# Patient Record
Sex: FEMALE | Race: WHITE | HISPANIC OR LATINO | Employment: UNEMPLOYED | URBAN - METROPOLITAN AREA
[De-identification: names, ages, dates, MRNs, and addresses within clinical notes are randomized per-mention and may not be internally consistent; named-entity substitution may affect disease eponyms.]

---

## 2021-11-11 ENCOUNTER — GYNECOLOGY VISIT (OUTPATIENT)
Dept: OBGYN | Facility: CLINIC | Age: 36
End: 2021-11-11
Payer: COMMERCIAL

## 2021-11-11 VITALS
DIASTOLIC BLOOD PRESSURE: 84 MMHG | HEART RATE: 86 BPM | WEIGHT: 189 LBS | RESPIRATION RATE: 20 BRPM | SYSTOLIC BLOOD PRESSURE: 124 MMHG

## 2021-11-11 DIAGNOSIS — N93.8 DUB (DYSFUNCTIONAL UTERINE BLEEDING): ICD-10-CM

## 2021-11-11 PROCEDURE — 99203 OFFICE O/P NEW LOW 30 MIN: CPT | Mod: 25 | Performed by: ADVANCED PRACTICE MIDWIFE

## 2021-11-11 PROCEDURE — 76857 US EXAM PELVIC LIMITED: CPT | Performed by: ADVANCED PRACTICE MIDWIFE

## 2021-11-11 NOTE — PROGRESS NOTES
Mayte Spivey is a 36 y.o. female who presents for DUB        HPI Comments: Pt presents for positive pregnancy test.  Patient's last menstrual period was 08/29/2021 (approximate). She reports no nausea at this time. Does complain of constipation with taking prenatal vitamin.    Review of Systems   Pertinent positives documented in HPI and all other systems reviewed & are negative      History reviewed. No pertinent past medical history.    Medications:   No current [facility]-ordered outpatient medications on file.     No current Epic-ordered facility-administered medications on file.          Objective:   Vital measurements:  Wt 85.7 kg (189 lb)   There is no height or weight on file to calculate BMI. (Goal BM I>18 <25)    Physical Exam   Nursing note and vitals reviewed.  Constitutional: She is oriented to person, place, and time. She appears well-developed and well-nourished. No distress.     Genitourinary:  Uterus size of 12   No vaginal bleeding or discharge noted.     Musculoskeletal: Normal range of motion. She exhibits no edema and no tenderness.     Skin: Skin is warm and dry. No rash noted. She is not diaphoretic. No erythema. No pallor.     Psychiatric: She has a normal mood and affect. Her behavior is normal. Judgment and thought content normal.     Transabdominal Ultrasound  Indication: DUB    Findings:  CRL: 4.05cm 4.14cm 4.23cm    FHR: 166 bpm      Impression: Single intrauterine pregnancy measuring 11 weeks and 0 days with cardiac activity.    Per ACOG dating guidelines, final ANIL is 06/05/2022 based on LMP consistent with US today.    Ultrasound performed and read by myself.          Assessment:     1. DUB (dysfunctional uterine bleeding)         Plan:   1. Discussed importance of prenatal vitamins with patient. Encouraged daily use.  2. Discussed NIPT testing with patient and kit was provided. Briefly discussed advanced maternal age and use of daily ASA 81 mg. She will start this in 2 weeks.   3.  Follow up in 2-3 weeks for NOB visit.

## 2021-11-11 NOTE — NON-PROVIDER
Pt here for DUB.    Pt states she is constipated, and is always hungry.   Good# [phone]  LMP: 8/29/21, 10w4d today.   Pharmacy confirmed .

## 2021-11-30 ENCOUNTER — HOSPITAL ENCOUNTER (OUTPATIENT)
Facility: MEDICAL CENTER | Age: 36
End: 2021-11-30
Attending: NURSE PRACTITIONER
Payer: COMMERCIAL

## 2021-11-30 ENCOUNTER — INITIAL PRENATAL (OUTPATIENT)
Dept: OBGYN | Facility: CLINIC | Age: 36
End: 2021-11-30
Payer: COMMERCIAL

## 2021-11-30 ENCOUNTER — APPOINTMENT (OUTPATIENT)
Dept: OBGYN | Facility: CLINIC | Age: 36
End: 2021-11-30
Payer: COMMERCIAL

## 2021-11-30 VITALS
BODY MASS INDEX: 38.51 KG/M2 | HEIGHT: 59 IN | WEIGHT: 191 LBS | SYSTOLIC BLOOD PRESSURE: 126 MMHG | DIASTOLIC BLOOD PRESSURE: 78 MMHG

## 2021-11-30 DIAGNOSIS — O09.891 SUPERVISION OF OTHER HIGH RISK PREGNANCIES, FIRST TRIMESTER: ICD-10-CM

## 2021-11-30 DIAGNOSIS — O09.521 AMA (ADVANCED MATERNAL AGE) MULTIGRAVIDA 35+, FIRST TRIMESTER: ICD-10-CM

## 2021-11-30 DIAGNOSIS — O09.299 H/O MACROSOMIA IN INFANT IN PRIOR PREGNANCY, CURRENTLY PREGNANT: ICD-10-CM

## 2021-11-30 DIAGNOSIS — O09.891 SUPERVISION OF OTHER HIGH RISK PREGNANCIES, FIRST TRIMESTER: Primary | ICD-10-CM

## 2021-11-30 PROBLEM — O09.523 MULTIGRAVIDA OF ADVANCED MATERNAL AGE IN THIRD TRIMESTER: Status: RESOLVED | Noted: 2021-11-30 | Resolved: 2021-11-30

## 2021-11-30 PROBLEM — O09.523 MULTIGRAVIDA OF ADVANCED MATERNAL AGE IN THIRD TRIMESTER: Status: ACTIVE | Noted: 2021-11-30

## 2021-11-30 LAB
APPEARANCE UR: ABNORMAL
BILIRUB UR STRIP-MCNC: ABNORMAL MG/DL
COLOR UR AUTO: ABNORMAL
GLUCOSE UR STRIP.AUTO-MCNC: NEGATIVE MG/DL
KETONES UR STRIP.AUTO-MCNC: NEGATIVE MG/DL
LEUKOCYTE ESTERASE UR QL STRIP.AUTO: NEGATIVE
NITRITE UR QL STRIP.AUTO: NEGATIVE
PH UR STRIP.AUTO: 8.5 [PH] (ref 5–8)
PROT UR QL STRIP: NEGATIVE MG/DL
RBC UR QL AUTO: NEGATIVE
SP GR UR STRIP.AUTO: 1.02
UROBILINOGEN UR STRIP-MCNC: ABNORMAL MG/DL

## 2021-11-30 PROCEDURE — 59402 PR NEW OB HIGH RISK: CPT | Performed by: NURSE PRACTITIONER

## 2021-11-30 PROCEDURE — 81002 URINALYSIS NONAUTO W/O SCOPE: CPT | Performed by: NURSE PRACTITIONER

## 2021-11-30 PROCEDURE — 90686 IIV4 VACC NO PRSV 0.5 ML IM: CPT | Performed by: NURSE PRACTITIONER

## 2021-11-30 PROCEDURE — 88175 CYTOPATH C/V AUTO FLUID REDO: CPT

## 2021-11-30 PROCEDURE — 90471 IMMUNIZATION ADMIN: CPT | Performed by: NURSE PRACTITIONER

## 2021-11-30 PROCEDURE — 87591 N.GONORRHOEAE DNA AMP PROB: CPT

## 2021-11-30 PROCEDURE — 87491 CHLMYD TRACH DNA AMP PROBE: CPT

## 2021-11-30 ASSESSMENT — EDINBURGH POSTNATAL DEPRESSION SCALE (EPDS)
I HAVE BEEN SO UNHAPPY THAT I HAVE HAD DIFFICULTY SLEEPING: NOT AT ALL
I HAVE FELT SCARED OR PANICKY FOR NO GOOD REASON: NO, NOT AT ALL
I HAVE LOOKED FORWARD WITH ENJOYMENT TO THINGS: AS MUCH AS I EVER DID
I HAVE FELT SAD OR MISERABLE: NO, NOT AT ALL
THINGS HAVE BEEN GETTING ON TOP OF ME: NO, I HAVE BEEN COPING AS WELL AS EVER
THE THOUGHT OF HARMING MYSELF HAS OCCURRED TO ME: NEVER
I HAVE BLAMED MYSELF UNNECESSARILY WHEN THINGS WENT WRONG: NO, NEVER
I HAVE BEEN SO UNHAPPY THAT I HAVE BEEN CRYING: NO, NEVER
TOTAL SCORE: 0
I HAVE BEEN ANXIOUS OR WORRIED FOR NO GOOD REASON: NO, NOT AT ALL
I HAVE BEEN ABLE TO LAUGH AND SEE THE FUNNY SIDE OF THINGS: AS MUCH AS I ALWAYS COULD

## 2021-11-30 NOTE — PROGRESS NOTES
NOB today. Had DUB 11/11/21  Last pap: needs one today  Phone # [phone]  Pharmacy confirmed  On PNV  Cystic Fibrosis test offered.  Early 1 gtt pended  No problems today  Flu vaccine given. R  Deltoid. Screening checklist reviewed with patient. VIS given. Verified by AC

## 2021-11-30 NOTE — PROGRESS NOTES
CC: New Ob visit     Subjective Ms. Mayte Spivey  13w2d by LMP c/w 11wk US presents for prenatal care. Today is her first prenatal visit at [facility] She denies any contractions/cramping, leakage of fluid, vaginal bleeding. She does feel faint movement.  Not presently working.  Pt denies any other pregnancies besides her first one.  Reports her first labor was short with a 9# baby.  No GDM.    I have reviewed the patients' medical, surgical, gynecological, obstetrical, social, and family histories, medications and available lab results and pertinent notes are as follows:     OB History    Para Term  AB Living   2 1 1 0 0 1   SAB IAB Ectopic Molar Multiple Live Births   0 0 0 0 0 1       Past Gynecological History:  Denies fibroids, ovarian cysts, abnormal pap smears, STDs. She denies ever having surgery on her cervix, uterus, or ovaries in the past.  Last pap smear was - none on file.    Past Medical History:   Diagnosis Date   • Anemia    • Blood transfusion without reported diagnosis      Past Surgical History:   Procedure Laterality Date   • ABDOMINAL EXPLORATION     • CHOLECYSTECTOMY        Social History     Socioeconomic History   • Marital status: Single     Spouse name: Not on file   • Number of children: Not on file   • Years of education: Not on file   • Highest education level: Not on file   Occupational History   • Not on file   Tobacco Use   • Smoking status: Never Smoker   • Smokeless tobacco: Never Used   Vaping Use   • Vaping Use: Never used   Substance and Sexual Activity   • Alcohol use: Not Currently   • Drug use: Not Currently     Types: Marijuana   • Sexual activity: Yes     Partners: Male     Comment: None    Other Topics Concern   • Not on file   Social History Narrative   • Not on file     Social Determinants of Health     Financial Resource Strain:    • Difficulty of Paying Living Expenses: Not on file   Food Insecurity:    • Worried About Running  Out of Food in the Last Year: Not on file   • Ran Out of Food in the Last Year: Not on file   Transportation Needs:    • Lack of Transportation (Medical): Not on file   • Lack of Transportation (Non-Medical): Not on file   Physical Activity:    • Days of Exercise per Week: Not on file   • Minutes of Exercise per Session: Not on file   Stress:    • Feeling of Stress : Not on file   Social Connections:    • Frequency of Communication with Friends and Family: Not on file   • Frequency of Social Gatherings with Friends and Family: Not on file   • Attends Muslim Services: Not on file   • Active Member of Clubs or Organizations: Not on file   • Attends Club or Organization Meetings: Not on file   • Marital Status: Not on file   Intimate Partner Violence:    • Fear of Current or Ex-Partner: Not on file   • Emotionally Abused: Not on file   • Physically Abused: Not on file   • Sexually Abused: Not on file   Housing Stability:    • Unable to Pay for Housing in the Last Year: Not on file   • Number of Places Lived in the Last Year: Not on file   • Unstable Housing in the Last Year: Not on file     Family History:   Family History   Problem Relation Age of Onset   • Cancer Father    • Prostate cancer Father        Genetic Screening/Teratology Counseling- Includes patient, baby's father, or anyone in either family with:  Patient's age 35 years or older as of estimated date of delivery: Yes   Thalassemia (Italian, Greek, Mediterranean, or  background): MCV less than 80: No   Neural tube defect (Meningomyelocele, Spina bifida, or Anencephaly): No   Congenital heart defect: No   Down syndrome: No   Myles-Sachs (Ashkenazi Quaker, Cajun, [place]): No   Canavan disease (Ashkenazi Quaker): No   Familial dysautonomia (Ashkenazi Quaker): No   Sickle cell disease or trait (): No   Hemophilia or other blood disorders: No   Muscular dystrophy: No    Cystic fibrosis: No   Burbank's chorea: No   Mental  "retardation/autism: No   Other inherited genetic or chromosomal disorder: No   Maternal metabolic disorder (eg. Type 1 diabetes, PKU): No   Patient or baby's father had child with birth defects not listed above: No   Recurrent pregnancy loss, or a stillbirth: No   Medications (including supplements, vitamins, herbs, or OTC drugs)/illicit/recreational drugs/alcohol since last menstrual period: No           Infection Prevention  1. High Risk For HIV: No 6. Rash Or Illness Since LMP: No   2. High Risk For Hepatitis B or C: No 7. History Of STD, GC, Chlamydia, HPV Syphilis: No   3. Live With Someone With TB Or Exposed To TB: No 8. Have a cat in the home?: No   4. Patient Or Partner Has A History Of Herpes: No    5. History of Chicken Pox: Yes 9. Other (See Comments Below): No   Comments: Planned and desired pregnancy. FOB involved and supportive. Different FOB. No outside work         Allergies: Patient has no known allergies.  Objective:/78   Ht 1.499 m (4' 11\")   Wt 86.6 kg (191 lb)      Objective:   FHTs: 150s via BSUS  Vitals:    21 1021   BP: 126/78     Prenatal Physical:  General Exam:  HEENT: normal    Heart: normal    Thyroid: normal    Lungs: normal    Lymph Nodes: normal    Breasts: normal    Neurological: normal    Abdomen: normal    Skin: normal    Extremities: normal    Pelvic Exam:    x 1 proven to 9#13oz  Vulva:  Normal  Vagina:  Normal  Cervix:  Normal  Uterus (wks):  13  Adnexa:  Normal  Rectum:  Not assessed       Lab: No results found for this or any previous visit (from the past 672 hour(s)).    Ultrasound:  Reviewed initial scan and ANIL is by CHHAYA c/w US    Assessment:  Patient Active Problem List   Diagnosis   • Supervision of other high risk pregnancies, first trimester   • AMA (advanced maternal age) multigravida 35+, first trimester   • H/O macrosomia in infant in prior pregnancy, currently pregnant       Plan:  Reviewed the patients risk factors for this pregnancy and " recommend the need for referral to perinatology - pt declines.  Prefers to do US at [facility].  Genetic screening was discussed with literature on Prenatal Screening, Cystic Fibrosis, and SMA provided and the patient plans to do NIPT, was already ordered by Phan.  Discuss importance of water intake, healthy diet, eating 5 small meals throughout the day to maintain blood sugar and taking PNV  If has nausea, take Vitamin B6 25mg TID with doxylamine/Unisom 25mg at night  Discuss importance of exercise, as well as rest  Prenatal labs, including MSAFP and early 1hr GTT ordered.  Pap/GCCT done.  Flu vaccine given.  SIMIN for delivery note @ [employer].  Complete OB US 7 wks.  Discuss policy for OB care at [facility]  Follow up in 4 weeks for next visit     Chaperone offered and provided by Lyubov García MA.    I have placed the below orders and discussed them with an approved delegating provider. The MA is performing the below orders under the direction of  Dr. Carmona.

## 2021-12-01 LAB
C TRACH DNA GENITAL QL NAA+PROBE: NEGATIVE
CYTOLOGY REG CYTOL: NORMAL
N GONORRHOEA DNA GENITAL QL NAA+PROBE: NEGATIVE
SPECIMEN SOURCE: NORMAL

## 2021-12-06 ENCOUNTER — TELEPHONE (OUTPATIENT)
Dept: OBGYN | Facility: CLINIC | Age: 36
End: 2021-12-06

## 2021-12-06 NOTE — TELEPHONE ENCOUNTER
----- Message from YOAN Lambert sent at 12/3/2021  8:25 AM PST -----  +Yeast - pt may do monistat 7 x 7 nights if symptomatic.  12/6/21@10:46am. Patient was notified and denies any symptoms.

## 2022-01-18 ENCOUNTER — TELEPHONE (OUTPATIENT)
Dept: OBGYN | Facility: CLINIC | Age: 37
End: 2022-01-18

## 2022-01-18 ENCOUNTER — ROUTINE PRENATAL (OUTPATIENT)
Dept: OBGYN | Facility: CLINIC | Age: 37
End: 2022-01-18
Payer: COMMERCIAL

## 2022-01-18 ENCOUNTER — APPOINTMENT (OUTPATIENT)
Dept: RADIOLOGY | Facility: IMAGING CENTER | Age: 37
End: 2022-01-18
Attending: NURSE PRACTITIONER
Payer: COMMERCIAL

## 2022-01-18 VITALS — WEIGHT: 199 LBS | DIASTOLIC BLOOD PRESSURE: 68 MMHG | BODY MASS INDEX: 40.19 KG/M2 | SYSTOLIC BLOOD PRESSURE: 128 MMHG

## 2022-01-18 DIAGNOSIS — O09.299 H/O MACROSOMIA IN INFANT IN PRIOR PREGNANCY, CURRENTLY PREGNANT: ICD-10-CM

## 2022-01-18 DIAGNOSIS — O09.521 AMA (ADVANCED MATERNAL AGE) MULTIGRAVIDA 35+, FIRST TRIMESTER: ICD-10-CM

## 2022-01-18 DIAGNOSIS — O09.891 SUPERVISION OF OTHER HIGH RISK PREGNANCIES, FIRST TRIMESTER: Primary | ICD-10-CM

## 2022-01-18 DIAGNOSIS — O09.891 SUPERVISION OF OTHER HIGH RISK PREGNANCIES, FIRST TRIMESTER: ICD-10-CM

## 2022-01-18 DIAGNOSIS — R93.89 ABNORMAL ULTRASOUND: Primary | ICD-10-CM

## 2022-01-18 DIAGNOSIS — O44.20 MARGINAL PLACENTA PREVIA: ICD-10-CM

## 2022-01-18 PROCEDURE — 76817 TRANSVAGINAL US OBSTETRIC: CPT | Mod: TC | Performed by: NURSE PRACTITIONER

## 2022-01-18 PROCEDURE — 90040 PR PRENATAL FOLLOW UP: CPT | Performed by: NURSE PRACTITIONER

## 2022-01-18 PROCEDURE — 76805 OB US >/= 14 WKS SNGL FETUS: CPT | Mod: TC | Performed by: NURSE PRACTITIONER

## 2022-01-18 NOTE — TELEPHONE ENCOUNTER
----- Message from YOAN Lambert sent at 1/18/2022  1:57 PM PST -----  Questionable AFO > referral sent to perinatology.  Marginal placenta previa > complete pelvic rest. No sex.  Review previa precautions.  We will f/u placenta location w perinatology.      Pt notified of placenta previa, advised to be on pelvic rest, no sex, nothing in vagina, no lifting anything > 10lb, if notice VB to go to L&D ASAP. Pt also notified need to recheck baby's heart for possible AFO. Explained to pt for the repeat US it has to be done with a perinatology. Pt informed referral was placed today and it usually take about 2 business days to be process, once it gets sent referral department will send her a massage via [employer] and the perinatology office should be calling her within 2 days. Advised to call the perinatology office on Monday 1/24/2022 if she doesn't hear from them or call us back if she has any questions. Pt notified her placenta location will be recheck with perinatology. Pt agreed and verbalized understanding.

## 2022-01-18 NOTE — PROGRESS NOTES
,OB follow up   + fetal movement.  No VB, LOF or UC's.  Phone # [phone]  Preferred pharmacy confirmed.  US done today  PNP not done yet.  NIPT not done, still planning to do it

## 2022-01-18 NOTE — PROGRESS NOTES
S: No c/o.  Has been stressed out with her other child who has seizures.  He was in the hospital for a few days and she hasn't had time to get her labs done as yet.  Just had US.  Has not yet done NIPT.  Reports faint FM.  Denies VB, LOF,  RUCs or vaginal DC.      O:    Vitals:    01/18/22 0948   BP: 128/68   Weight: 90.3 kg (199 lb)     See flow sheet.    A: IUP 20w2d  Patient Active Problem List    Diagnosis Date Noted   • Supervision of other high risk pregnancies, first trimester 11/30/2021   • AMA (advanced maternal age) multigravida 35+, first trimester 11/30/2021   • H/O macrosomia in infant in prior pregnancy, currently pregnant 11/30/2021       P:  1.  Reviewed labs w pt. PNP and NIPT not done - encouraged pt to complete asap.        2.  Not done yet AFP.        3.  US is pending, just completed.        4.  Questions answered.        5.  Encouraged adequate water intake.        6.  F/u 4 wks.

## 2022-02-15 PROBLEM — O09.522 AMA (ADVANCED MATERNAL AGE) MULTIGRAVIDA 35+, SECOND TRIMESTER: Status: ACTIVE | Noted: 2021-11-30

## 2022-04-09 ENCOUNTER — HOSPITAL ENCOUNTER (EMERGENCY)
Facility: MEDICAL CENTER | Age: 37
End: 2022-04-10
Attending: OBSTETRICS & GYNECOLOGY | Admitting: OBSTETRICS & GYNECOLOGY
Payer: COMMERCIAL

## 2022-04-09 LAB
ABO GROUP BLD: NORMAL
ALBUMIN SERPL BCP-MCNC: 3.1 G/DL (ref 3.2–4.9)
ALBUMIN/GLOB SERPL: 1.1 G/DL
ALP SERPL-CCNC: 82 U/L (ref 30–99)
ALT SERPL-CCNC: 8 U/L (ref 2–50)
ANION GAP SERPL CALC-SCNC: 12 MMOL/L (ref 7–16)
APPEARANCE UR: CLEAR
APTT PPP: 26.7 SEC (ref 24.7–36)
AST SERPL-CCNC: 14 U/L (ref 12–45)
BASOPHILS # BLD AUTO: 0.3 % (ref 0–1.8)
BASOPHILS # BLD: 0.03 K/UL (ref 0–0.12)
BILIRUB SERPL-MCNC: 0.2 MG/DL (ref 0.1–1.5)
BLD GP AB SCN SERPL QL: NORMAL
BUN SERPL-MCNC: 10 MG/DL (ref 8–22)
CALCIUM SERPL-MCNC: 9 MG/DL (ref 8.5–10.5)
CHLORIDE SERPL-SCNC: 105 MMOL/L (ref 96–112)
CO2 SERPL-SCNC: 19 MMOL/L (ref 20–33)
COLOR UR AUTO: YELLOW
CREAT SERPL-MCNC: 0.32 MG/DL (ref 0.5–1.4)
CREAT UR-MCNC: 101.47 MG/DL
EOSINOPHIL # BLD AUTO: 0.21 K/UL (ref 0–0.51)
EOSINOPHIL NFR BLD: 1.9 % (ref 0–6.9)
ERYTHROCYTE [DISTWIDTH] IN BLOOD BY AUTOMATED COUNT: 40.3 FL (ref 35.9–50)
ERYTHROCYTE [DISTWIDTH] IN BLOOD BY AUTOMATED COUNT: 40.8 FL (ref 35.9–50)
GFR SERPLBLD CREATININE-BSD FMLA CKD-EPI: 137 ML/MIN/1.73 M 2
GLOBULIN SER CALC-MCNC: 2.9 G/DL (ref 1.9–3.5)
GLUCOSE 1H P 50 G GLC PO SERPL-MCNC: 121 MG/DL (ref 70–139)
GLUCOSE SERPL-MCNC: 90 MG/DL (ref 65–99)
GLUCOSE UR QL STRIP.AUTO: NEGATIVE MG/DL
HBV SURFACE AG SER QL: ABNORMAL
HCT VFR BLD AUTO: 29.1 % (ref 37–47)
HCT VFR BLD AUTO: 29.7 % (ref 37–47)
HCV AB SER QL: ABNORMAL
HGB BLD-MCNC: 9.3 G/DL (ref 12–16)
HGB BLD-MCNC: 9.6 G/DL (ref 12–16)
HIV 1+2 AB+HIV1 P24 AG SERPL QL IA: NORMAL
IMM GRANULOCYTES # BLD AUTO: 0.16 K/UL (ref 0–0.11)
IMM GRANULOCYTES NFR BLD AUTO: 1.4 % (ref 0–0.9)
INR PPP: 1.03 (ref 0.87–1.13)
KETONES UR QL STRIP.AUTO: NEGATIVE MG/DL
LEUKOCYTE ESTERASE UR QL STRIP.AUTO: ABNORMAL
LYMPHOCYTES # BLD AUTO: 2.09 K/UL (ref 1–4.8)
LYMPHOCYTES NFR BLD: 18.7 % (ref 22–41)
MCH RBC QN AUTO: 26.7 PG (ref 27–33)
MCH RBC QN AUTO: 26.8 PG (ref 27–33)
MCHC RBC AUTO-ENTMCNC: 32 G/DL (ref 33.6–35)
MCHC RBC AUTO-ENTMCNC: 32.3 G/DL (ref 33.6–35)
MCV RBC AUTO: 82.5 FL (ref 81.4–97.8)
MCV RBC AUTO: 83.9 FL (ref 81.4–97.8)
MONOCYTES # BLD AUTO: 0.93 K/UL (ref 0–0.85)
MONOCYTES NFR BLD AUTO: 8.3 % (ref 0–13.4)
NEUTROPHILS # BLD AUTO: 7.73 K/UL (ref 2–7.15)
NEUTROPHILS NFR BLD: 69.4 % (ref 44–72)
NITRITE UR QL STRIP.AUTO: NEGATIVE
NRBC # BLD AUTO: 0 K/UL
NRBC BLD-RTO: 0 /100 WBC
PH UR STRIP.AUTO: 6 [PH] (ref 5–8)
PLATELET # BLD AUTO: 330 K/UL (ref 164–446)
PLATELET # BLD AUTO: 367 K/UL (ref 164–446)
PMV BLD AUTO: 8.6 FL (ref 9–12.9)
PMV BLD AUTO: 8.9 FL (ref 9–12.9)
POTASSIUM SERPL-SCNC: 4.5 MMOL/L (ref 3.6–5.5)
PROT SERPL-MCNC: 6 G/DL (ref 6–8.2)
PROT UR QL STRIP: 30 MG/DL
PROT UR-MCNC: 22 MG/DL (ref 0–15)
PROT/CREAT UR: 217 MG/G (ref 10–107)
PROTHROMBIN TIME: 13.2 SEC (ref 12–14.6)
RBC # BLD AUTO: 3.47 M/UL (ref 4.2–5.4)
RBC # BLD AUTO: 3.6 M/UL (ref 4.2–5.4)
RBC UR QL AUTO: ABNORMAL
RH BLD: NORMAL
RUBV AB SER QL: 55.5 IU/ML
SODIUM SERPL-SCNC: 136 MMOL/L (ref 135–145)
SP GR UR STRIP.AUTO: 1.02 (ref 1–1.03)
T PALLIDUM AB SER QL IA: ABNORMAL
URATE SERPL-MCNC: 3.5 MG/DL (ref 1.9–8.2)
WBC # BLD AUTO: 11.2 K/UL (ref 4.8–10.8)
WBC # BLD AUTO: 12.3 K/UL (ref 4.8–10.8)

## 2022-04-09 PROCEDURE — 96372 THER/PROPH/DIAG INJ SC/IM: CPT

## 2022-04-09 PROCEDURE — 36415 COLL VENOUS BLD VENIPUNCTURE: CPT

## 2022-04-09 PROCEDURE — 86762 RUBELLA ANTIBODY: CPT

## 2022-04-09 PROCEDURE — 86900 BLOOD TYPING SEROLOGIC ABO: CPT

## 2022-04-09 PROCEDURE — 86780 TREPONEMA PALLIDUM: CPT

## 2022-04-09 PROCEDURE — 81002 URINALYSIS NONAUTO W/O SCOPE: CPT

## 2022-04-09 PROCEDURE — 84550 ASSAY OF BLOOD/URIC ACID: CPT

## 2022-04-09 PROCEDURE — 86901 BLOOD TYPING SEROLOGIC RH(D): CPT

## 2022-04-09 PROCEDURE — 85027 COMPLETE CBC AUTOMATED: CPT | Mod: XU

## 2022-04-09 PROCEDURE — 80053 COMPREHEN METABOLIC PANEL: CPT

## 2022-04-09 PROCEDURE — 59025 FETAL NON-STRESS TEST: CPT

## 2022-04-09 PROCEDURE — 99284 EMERGENCY DEPT VISIT MOD MDM: CPT

## 2022-04-09 PROCEDURE — 87389 HIV-1 AG W/HIV-1&-2 AB AG IA: CPT

## 2022-04-09 PROCEDURE — 700102 HCHG RX REV CODE 250 W/ 637 OVERRIDE(OP): Performed by: STUDENT IN AN ORGANIZED HEALTH CARE EDUCATION/TRAINING PROGRAM

## 2022-04-09 PROCEDURE — 85610 PROTHROMBIN TIME: CPT

## 2022-04-09 PROCEDURE — 85025 COMPLETE CBC W/AUTO DIFF WBC: CPT

## 2022-04-09 PROCEDURE — 85730 THROMBOPLASTIN TIME PARTIAL: CPT

## 2022-04-09 PROCEDURE — 700111 HCHG RX REV CODE 636 W/ 250 OVERRIDE (IP): Performed by: OBSTETRICS & GYNECOLOGY

## 2022-04-09 PROCEDURE — 87340 HEPATITIS B SURFACE AG IA: CPT

## 2022-04-09 PROCEDURE — 86850 RBC ANTIBODY SCREEN: CPT

## 2022-04-09 PROCEDURE — 84156 ASSAY OF PROTEIN URINE: CPT

## 2022-04-09 PROCEDURE — 86592 SYPHILIS TEST NON-TREP QUAL: CPT

## 2022-04-09 PROCEDURE — 82570 ASSAY OF URINE CREATININE: CPT

## 2022-04-09 PROCEDURE — 86803 HEPATITIS C AB TEST: CPT

## 2022-04-09 PROCEDURE — 82950 GLUCOSE TEST: CPT

## 2022-04-09 PROCEDURE — 302790 HCHG STAT ANTEPARTUM CARE, DAILY

## 2022-04-09 PROCEDURE — A9270 NON-COVERED ITEM OR SERVICE: HCPCS | Performed by: STUDENT IN AN ORGANIZED HEALTH CARE EDUCATION/TRAINING PROGRAM

## 2022-04-09 RX ORDER — BETAMETHASONE SODIUM PHOSPHATE AND BETAMETHASONE ACETATE 3; 3 MG/ML; MG/ML
12 INJECTION, SUSPENSION INTRA-ARTICULAR; INTRALESIONAL; INTRAMUSCULAR; SOFT TISSUE EVERY 24 HOURS
Status: COMPLETED | OUTPATIENT
Start: 2022-04-09 | End: 2022-04-10

## 2022-04-09 RX ORDER — DOCUSATE SODIUM 100 MG/1
100 CAPSULE, LIQUID FILLED ORAL 2 TIMES DAILY PRN
Status: DISCONTINUED | OUTPATIENT
Start: 2022-04-09 | End: 2022-04-10 | Stop reason: HOSPADM

## 2022-04-09 RX ORDER — POLYETHYLENE GLYCOL 3350 17 G/17G
1 POWDER, FOR SOLUTION ORAL
Status: DISCONTINUED | OUTPATIENT
Start: 2022-04-09 | End: 2022-04-10 | Stop reason: HOSPADM

## 2022-04-09 RX ADMIN — BETAMETHASONE SODIUM PHOSPHATE AND BETAMETHASONE ACETATE 12 MG: 3; 3 INJECTION, SUSPENSION INTRA-ARTICULAR; INTRALESIONAL; INTRAMUSCULAR at 13:08

## 2022-04-09 RX ADMIN — POLYETHYLENE GLYCOL 3350 1 PACKET: 17 POWDER, FOR SOLUTION ORAL at 16:36

## 2022-04-09 RX ADMIN — DOCUSATE SODIUM 100 MG: 100 CAPSULE, LIQUID FILLED ORAL at 16:36

## 2022-04-09 ASSESSMENT — PATIENT HEALTH QUESTIONNAIRE - PHQ9
SUM OF ALL RESPONSES TO PHQ9 QUESTIONS 1 AND 2: 0
2. FEELING DOWN, DEPRESSED, IRRITABLE, OR HOPELESS: NOT AT ALL
1. LITTLE INTEREST OR PLEASURE IN DOING THINGS: NOT AT ALL

## 2022-04-09 ASSESSMENT — LIFESTYLE VARIABLES
HAVE YOU EVER FELT YOU SHOULD CUT DOWN ON YOUR DRINKING: NO
TOTAL SCORE: 0
EVER HAD A DRINK FIRST THING IN THE MORNING TO STEADY YOUR NERVES TO GET RID OF A HANGOVER: NO
AVERAGE NUMBER OF DAYS PER WEEK YOU HAVE A DRINK CONTAINING ALCOHOL: 0
TOTAL SCORE: 0
TOTAL SCORE: 0
ON A TYPICAL DAY WHEN YOU DRINK ALCOHOL HOW MANY DRINKS DO YOU HAVE: 0
HOW MANY TIMES IN THE PAST YEAR HAVE YOU HAD 5 OR MORE DRINKS IN A DAY: 0
CONSUMPTION TOTAL: NEGATIVE
EVER FELT BAD OR GUILTY ABOUT YOUR DRINKING: NO
EVER_SMOKED: NEVER
HAVE PEOPLE ANNOYED YOU BY CRITICIZING YOUR DRINKING: NO
ALCOHOL_USE: NO

## 2022-04-09 ASSESSMENT — COPD QUESTIONNAIRES
COPD SCREENING SCORE: 0
IN THE PAST 12 MONTHS DO YOU DO LESS THAN YOU USED TO BECAUSE OF YOUR BREATHING PROBLEMS: DISAGREE/UNSURE
DURING THE PAST 4 WEEKS HOW MUCH DID YOU FEEL SHORT OF BREATH: NONE/LITTLE OF THE TIME
DO YOU EVER COUGH UP ANY MUCUS OR PHLEGM?: NO/ONLY WITH OCCASIONAL COLDS OR INFECTIONS
HAVE YOU SMOKED AT LEAST 100 CIGARETTES IN YOUR ENTIRE LIFE: NO/DON'T KNOW

## 2022-04-09 NOTE — ED PROVIDER NOTES
OB ED Note:    CC: spotting    HPI:  Ms. Mayte Spivey is a 36 y.o.  @ 31w6d by LMP consistent with 11 week ultrasound presenting today for bleeding. She states that she noticed brown blood in the toilet bowl this morning that looked like a clot. Patient states that she was straining to have a bowel movement when this happened and that she has been constipated recently.   Pregnancy complicated by AMA, placenta previa and she is seeing [facility]. Fetal aneurysmal foramen ovale and intrahepatic umbilical varix seen on fetal ultrasound.      Contractions: No   Loss of fluid: No   Vaginal bleeding: No   Fetal movement: present      PNC with [facility]    PNL:  Not yet completed       ROS:  Const: denies fevers, general concerns  CV/resp: reports no concerns  GI: denies abd pain, GI concerns  : see HPI  Neuro: denies HA/vision changes    OB History    Para Term  AB Living   2 1 1     1   SAB IAB Ectopic Molar Multiple Live Births             1      # Outcome Date GA Lbr Edward/2nd Weight Sex Delivery Anes PTL Lv   2 Current            1 Term 09 41w0d  4.451 kg (9 lb 13 oz) M Vag-Spont  N LIVIA       Past Medical History:   Diagnosis Date   • Blood transfusion without reported diagnosis        Past Surgical History:   Procedure Laterality Date   • ABDOMINAL EXPLORATION     • CHOLECYSTECTOMY         No current facility-administered medications on file prior to encounter.     Current Outpatient Medications on File Prior to Encounter   Medication Sig Dispense Refill   • PRENATAL VIT-DOCUSATE-IRON-FA PO Take  by mouth.     • aspirin EC (ECOTRIN) 81 MG Tablet Delayed Response Take 81 mg by mouth every day.         Family History   Problem Relation Age of Onset   • Cancer Father    • Prostate cancer Father        Social History     Tobacco Use   • Smoking status: Never Smoker   • Smokeless tobacco: Never Used   Vaping Use   • Vaping Use: Never used   Substance Use Topics   • Alcohol use: Not  "Currently   • Drug use: Not Currently     Types: Marijuana         PE:  Vitals:    22 1034 22 1105 22 1115 22 1120   BP:  149/89 139/93 130/83   Pulse:  (!) 106 100 98   Weight: 97.5 kg (215 lb)      Height: 1.499 m (4' 11\")        gen: AAO, NAD  abd: soft, gravid, NT  Ext: NT, no edema    FHT: 130/moderate variability/+ accels/ - decels  Grady:uterine irritability but no uterine contractions    A/P: 36 y.o.  @ 31w6d by LMP consistent with 11 week ultrasound presenting today for complaints of vaginal spotting since this morning. Pregnancy complicated by AMA and placenta previa and is seeing [facility]. Fetal aneurysmal foramen ovale and intrahepatic umbilical varix seen on fetal ultrasound.    -antepartum for observation   -colace BID prn and Miralax once daily prn constipation         Martha Milton M.D.        "

## 2022-04-09 NOTE — PROGRESS NOTES
1300-Report received, care assumed. POC dicussed with pt. Pt denies any questions or needs at this time  1430-Labs drawn. Diet tray ordered.   1605-POC dicussed with Dr Lewis. Orders updated. Pt notified. Pt states understanding and denies any questions   1636-Pt feeling constipated. Meds given. Pt denies any other needs at this time   1815-Pt uncomfortable with constipation. POC dicussed with Dr Lewis, Enema order received.   1820-Enema dicussed with pt. Enema procedure explained. Pt desires enema now  1825-Enema administered by RN. Pt tolerated well.  1845-Pt pulled emergency bathroom cord. Up to bathroom s/p enema. Pt had golf size clot fall out when running to the bathroom. No more bleeding noted. BM successful. Pt cleaned.   1900-Report given to NOC RN

## 2022-04-09 NOTE — PROGRESS NOTES
36 y.o.  @ 31.6 c/o constipation and vaginal bleeding with straining to have a BM. Pt being followed by [facility] for placenta previa.   EFM & Ransom applied. Reactive NST obtained, elevated BP, PIH labs ordered. Denies current h/a, hx of migraines.

## 2022-04-09 NOTE — H&P
OB H&P:    CC: spotting    HPI:  Ms. Mayte Spivey is a 36 y.o.  @ 31w6d by LMP consistent with 11 week ultrasound presenting today for bleeding. She states that she noticed brown blood in the toilet bowl this morning that looked like a clot. Patient states that she was straining to have a bowel movement when this happened and that she has been constipated recently.   Pregnancy complicated by AMA, placenta previa and she is seeing [facility]. Fetal     Contractions: No   Loss of fluid: No   Vaginal bleeding: No   Fetal movement: present      PNC with [facility]     PNL:  Not yet completed       ROS:  Const: denies fevers, general concerns  CV/resp: reports no concerns  GI: denies abd pain, GI concerns  : see HPI  Neuro: denies HA/vision changes    OB History    Para Term  AB Living   2 1 1     1   SAB IAB Ectopic Molar Multiple Live Births             1      # Outcome Date GA Lbr Edward/2nd Weight Sex Delivery Anes PTL Lv   2 Current            1 Term 09 41w0d  4.451 kg (9 lb 13 oz) M Vag-Spont  N LIVIA       GYN: denies STIs, no cervical procedures    Past Medical History:   Diagnosis Date   • Blood transfusion without reported diagnosis        Past Surgical History:   Procedure Laterality Date   • ABDOMINAL EXPLORATION     • CHOLECYSTECTOMY         No current facility-administered medications on file prior to encounter.     Current Outpatient Medications on File Prior to Encounter   Medication Sig Dispense Refill   • PRENATAL VIT-DOCUSATE-IRON-FA PO Take  by mouth.     • aspirin EC (ECOTRIN) 81 MG Tablet Delayed Response Take 81 mg by mouth every day.         Family History   Problem Relation Age of Onset   • Cancer Father    • Prostate cancer Father        Social History     Tobacco Use   • Smoking status: Never Smoker   • Smokeless tobacco: Never Used   Vaping Use   • Vaping Use: Never used   Substance Use Topics   • Alcohol use: Not Currently   • Drug use: Not Currently      Types: Marijuana         PE:  Vitals:    22 1105 22 1115 22 1120 22 1341   BP: 149/89 139/93 130/83 130/83   Pulse: (!) 106 100 98 98   Resp:    20   Temp:    (!) 35.6 °C (96 °F)   SpO2:    96%   Weight:       Height:         gen: AAO, NAD  abd: soft, gravid, NT  Ext: NT, no edema    FHT: 130/moderate variability/+ accels/ - decels  Grady:uterine irritability but no uterine contractions     A/P: 36 y.o.  @ 31w6d by LMP consistent with 11 week ultrasound presenting today for complaints of vaginal spotting since this morning. Pregnancy complicated by AMA and placenta previa and is seeing [facility]. Fetal aneurysmal foramen ovale and intrahepatic umbilical varix seen on fetal ultrasound.  -antepartum for observation   -colace BID prn and Miralax once daily prn constipation   -prenatal panel, type and screen, 1 hour GTT ordered as not yet completed outpatient  -APTT and PT ordered   -some elevated blood pressures in triage-PIH labs showed protein creatinine ratio of 217,  LFTs within normal range, platelets normal         Martha Milton M.D.      I saw and examined the patient and discussed the management with the resident. I reviewed the resident's note and agree with the documented findings and plan of care.    Additional attending comments:  Will admit for obs and BMZ administration. If no more bleeding in 24 hours, plan to DC home with bleeding precautions.

## 2022-04-10 VITALS
SYSTOLIC BLOOD PRESSURE: 141 MMHG | HEART RATE: 115 BPM | BODY MASS INDEX: 43.34 KG/M2 | RESPIRATION RATE: 18 BRPM | WEIGHT: 215 LBS | OXYGEN SATURATION: 98 % | DIASTOLIC BLOOD PRESSURE: 65 MMHG | TEMPERATURE: 98.9 F | HEIGHT: 59 IN

## 2022-04-10 PROCEDURE — 96372 THER/PROPH/DIAG INJ SC/IM: CPT

## 2022-04-10 PROCEDURE — 59025 FETAL NON-STRESS TEST: CPT

## 2022-04-10 PROCEDURE — A9270 NON-COVERED ITEM OR SERVICE: HCPCS | Performed by: STUDENT IN AN ORGANIZED HEALTH CARE EDUCATION/TRAINING PROGRAM

## 2022-04-10 PROCEDURE — A9270 NON-COVERED ITEM OR SERVICE: HCPCS | Performed by: OBSTETRICS & GYNECOLOGY

## 2022-04-10 PROCEDURE — 700102 HCHG RX REV CODE 250 W/ 637 OVERRIDE(OP): Performed by: OBSTETRICS & GYNECOLOGY

## 2022-04-10 PROCEDURE — 700102 HCHG RX REV CODE 250 W/ 637 OVERRIDE(OP): Performed by: STUDENT IN AN ORGANIZED HEALTH CARE EDUCATION/TRAINING PROGRAM

## 2022-04-10 PROCEDURE — 99283 EMERGENCY DEPT VISIT LOW MDM: CPT | Performed by: OBSTETRICS & GYNECOLOGY

## 2022-04-10 PROCEDURE — 700111 HCHG RX REV CODE 636 W/ 250 OVERRIDE (IP): Performed by: OBSTETRICS & GYNECOLOGY

## 2022-04-10 RX ADMIN — ASPIRIN 81 MG: 81 TABLET, COATED ORAL at 07:26

## 2022-04-10 RX ADMIN — BETAMETHASONE SODIUM PHOSPHATE AND BETAMETHASONE ACETATE 12 MG: 3; 3 INJECTION, SUSPENSION INTRA-ARTICULAR; INTRALESIONAL; INTRAMUSCULAR at 12:59

## 2022-04-10 RX ADMIN — DOCUSATE SODIUM 100 MG: 100 CAPSULE, LIQUID FILLED ORAL at 07:25

## 2022-04-10 ASSESSMENT — PATIENT HEALTH QUESTIONNAIRE - PHQ9
SUM OF ALL RESPONSES TO PHQ9 QUESTIONS 1 AND 2: 0
1. LITTLE INTEREST OR PLEASURE IN DOING THINGS: NOT AT ALL

## 2022-04-10 NOTE — CARE PLAN
Problem: Knowledge Deficit - L&D  Goal: Patient and family/caregivers will demonstrate understanding of plan of care, disease process/condition, diagnostic tests and medications  Outcome: Progressing   The patient is Watcher - Medium risk of patient condition declining or worsening         Progress made toward(s) clinical / shift goals:  Monitor for vaginal bleeding .    Patient is not progressing towards the following goals:NA

## 2022-04-10 NOTE — PROGRESS NOTES
1900: Report received from Sara GRANDE. All care of pt assumed. Assessment done, pt comfortable in bed, c/o small clot when she went to restroom. Instructed to let nurse see her pad when she goes to the restroom next time. Pt v/u    2152: Pt up to bathroom. Passed a quarter sized clot in toilet. Brownish blood on pad, pad changed.     0508: Pt up to restroom, pads changed with RN at bedside. Brownish nickel sized spot of blood on pad.    0700: Report given to Ashwini MEYER RN

## 2022-04-10 NOTE — DISCHARGE SUMMARY
ANTEPARTUM PROGRESS NOTE;    Mayte Spivey is a 36 y.o. female  at 32w0d.  Admitted for overnight observation on 2022 due to some brown spotting with a history of placenta previa.  The patient is not having any contractions and she denies any further spotting.  Patient has received her second dose of betamethasone and states she is having good fetal movement denies leakage of fluid.    Patient Active Problem List    Diagnosis Date Noted   • AMA (advanced maternal age) multigravida 35+, second trimester 2021   • H/O macrosomia in infant in prior pregnancy, currently pregnant 2021       Review of systems; denies vaginal bleeding, leakage of fluid, uterine contractions, fever chills or abdominal pain  Past Medical History:   Diagnosis Date   • Blood transfusion without reported diagnosis      Past Surgical History:   Procedure Laterality Date   • ABDOMINAL EXPLORATION     • CHOLECYSTECTOMY       Patient has no known allergies.  Social History     Socioeconomic History   • Marital status: Single     Spouse name: Not on file   • Number of children: Not on file   • Years of education: Not on file   • Highest education level: Not on file   Occupational History   • Not on file   Tobacco Use   • Smoking status: Never Smoker   • Smokeless tobacco: Never Used   Vaping Use   • Vaping Use: Never used   Substance and Sexual Activity   • Alcohol use: Not Currently   • Drug use: Not Currently     Types: Marijuana   • Sexual activity: Yes     Partners: Male     Comment: None    Other Topics Concern   • Not on file   Social History Narrative   • Not on file     Social Determinants of Health     Financial Resource Strain: Not on file   Food Insecurity: Not on file   Transportation Needs: Not on file   Physical Activity: Not on file   Stress: Not on file   Social Connections: Not on file   Intimate Partner Violence: Not on file   Housing Stability: Not on file         Physical examination;  Alert and  "oriented x3  Gen.-well-developed well-nourished female in no apparent distress  HEENT-normocephalic, nontraumatic,EOMI,PERRLA  /65   Pulse (!) 115   Temp 37.2 °C (98.9 °F) (Temporal)   Resp 18   Ht 1.499 m (4' 11\")   Wt 97.5 kg (215 lb)   LMP 08/29/2021 (Approximate)   SpO2 98%   BMI 43.42 kg/m²   Skin is warm and dry  Back-negative for CVA tenderness  Cardiovascular-regular rate and rhythm, normal S1-S2 no murmurs gallops  Lungs-clear to auscultation bilaterally  Abdomen-nondistended positive bowel sounds soft nontender without masses or hepatosplenomegaly  Cervix-not examined due to placenta previa  Extremities without cyanosis clubbing or edema  Neurologic grossly intact    Labs;      NST-as performed and read by myself; reactive NST without contractions    Impression;  IUP AT 32w0d  Placenta previa  History of vaginal bleeding now resolved    Plan;  Discharge to home with scheduled follow-up within 1 week with her nurse midwife.  Patient has been encouraged to come back to labor and delivery for increased vaginal bleeding or uterine contractions.      Chandana Lee MD  "

## 2022-04-10 NOTE — PROGRESS NOTES
0700: Report from RN Jada. Care assumed at this time. RN to room. Pt awake and resting in bed. Pt stated no more bleeding since night nurse was in last.     0918: Pt on for NST. Pt denies any bleeding  And no contractions at this time.     1215: Pt. Was brought lunch. No complaints from pt.      1302: RN at BS for Betamethasone shot.     1340: Rn at BS. Pt denies any bleeding or feeling any contractions.     1550: MD Lee at BS to discharge pt.     1630: Discharge instructions reviewed with pt. Pt is stable with no active bleeding at this time. Reports no contractions and active fetal movement. VS stable.

## 2022-04-14 ENCOUNTER — HOSPITAL ENCOUNTER (OUTPATIENT)
Dept: LAB | Facility: MEDICAL CENTER | Age: 37
End: 2022-04-14
Attending: NURSE PRACTITIONER
Payer: COMMERCIAL

## 2022-04-14 ENCOUNTER — ROUTINE PRENATAL (OUTPATIENT)
Dept: OBGYN | Facility: CLINIC | Age: 37
End: 2022-04-14
Payer: COMMERCIAL

## 2022-04-14 VITALS — DIASTOLIC BLOOD PRESSURE: 76 MMHG | BODY MASS INDEX: 44.43 KG/M2 | WEIGHT: 220 LBS | SYSTOLIC BLOOD PRESSURE: 124 MMHG

## 2022-04-14 DIAGNOSIS — R51.9 PREGNANCY HEADACHE IN THIRD TRIMESTER: ICD-10-CM

## 2022-04-14 DIAGNOSIS — O44.03 COMPLETE PLACENTA PREVIA NOS OR WITHOUT HEMORRHAGE, THIRD TRIMESTER: ICD-10-CM

## 2022-04-14 DIAGNOSIS — O26.893 PREGNANCY HEADACHE IN THIRD TRIMESTER: ICD-10-CM

## 2022-04-14 DIAGNOSIS — O09.522 AMA (ADVANCED MATERNAL AGE) MULTIGRAVIDA 35+, SECOND TRIMESTER: Primary | ICD-10-CM

## 2022-04-14 LAB
ALBUMIN SERPL BCP-MCNC: 3.5 G/DL (ref 3.2–4.9)
ALBUMIN/GLOB SERPL: 1.2 G/DL
ALP SERPL-CCNC: 79 U/L (ref 30–99)
ALT SERPL-CCNC: 11 U/L (ref 2–50)
ANION GAP SERPL CALC-SCNC: 12 MMOL/L (ref 7–16)
AST SERPL-CCNC: 13 U/L (ref 12–45)
BILIRUB SERPL-MCNC: 0.2 MG/DL (ref 0.1–1.5)
BUN SERPL-MCNC: 17 MG/DL (ref 8–22)
CALCIUM SERPL-MCNC: 9.5 MG/DL (ref 8.5–10.5)
CHLORIDE SERPL-SCNC: 100 MMOL/L (ref 96–112)
CO2 SERPL-SCNC: 22 MMOL/L (ref 20–33)
CREAT SERPL-MCNC: 0.41 MG/DL (ref 0.5–1.4)
CREAT UR-MCNC: 58.33 MG/DL
ERYTHROCYTE [DISTWIDTH] IN BLOOD BY AUTOMATED COUNT: 43.5 FL (ref 35.9–50)
GFR SERPLBLD CREATININE-BSD FMLA CKD-EPI: 129 ML/MIN/1.73 M 2
GLOBULIN SER CALC-MCNC: 3 G/DL (ref 1.9–3.5)
GLUCOSE SERPL-MCNC: 78 MG/DL (ref 65–99)
HCT VFR BLD AUTO: 32.3 % (ref 37–47)
HGB BLD-MCNC: 9.7 G/DL (ref 12–16)
MCH RBC QN AUTO: 26 PG (ref 27–33)
MCHC RBC AUTO-ENTMCNC: 30 G/DL (ref 33.6–35)
MCV RBC AUTO: 86.6 FL (ref 81.4–97.8)
PLATELET # BLD AUTO: 387 K/UL (ref 164–446)
PMV BLD AUTO: 9 FL (ref 9–12.9)
POTASSIUM SERPL-SCNC: 5.4 MMOL/L (ref 3.6–5.5)
PROT SERPL-MCNC: 6.5 G/DL (ref 6–8.2)
PROT UR-MCNC: 11 MG/DL (ref 0–15)
PROT/CREAT UR: 189 MG/G (ref 10–107)
RBC # BLD AUTO: 3.73 M/UL (ref 4.2–5.4)
SODIUM SERPL-SCNC: 134 MMOL/L (ref 135–145)
URATE SERPL-MCNC: 3.4 MG/DL (ref 1.9–8.2)
WBC # BLD AUTO: 20.2 K/UL (ref 4.8–10.8)

## 2022-04-14 PROCEDURE — 90715 TDAP VACCINE 7 YRS/> IM: CPT | Performed by: NURSE PRACTITIONER

## 2022-04-14 PROCEDURE — 90040 PR PRENATAL FOLLOW UP: CPT | Performed by: NURSE PRACTITIONER

## 2022-04-14 PROCEDURE — 36415 COLL VENOUS BLD VENIPUNCTURE: CPT

## 2022-04-14 PROCEDURE — 84550 ASSAY OF BLOOD/URIC ACID: CPT

## 2022-04-14 PROCEDURE — 85027 COMPLETE CBC AUTOMATED: CPT

## 2022-04-14 PROCEDURE — 80053 COMPREHEN METABOLIC PANEL: CPT

## 2022-04-14 PROCEDURE — 82570 ASSAY OF URINE CREATININE: CPT

## 2022-04-14 PROCEDURE — 84156 ASSAY OF PROTEIN URINE: CPT

## 2022-04-14 PROCEDURE — 90471 IMMUNIZATION ADMIN: CPT | Performed by: NURSE PRACTITIONER

## 2022-04-14 ASSESSMENT — FIBROSIS 4 INDEX: FIB4 SCORE: 0.55

## 2022-04-14 NOTE — LETTER
"Count Your Baby's Movements  Another step to a healthy delivery    Mayte Spivey              Dept: [phone]    How Many Weeks Pregnant? 32w4d    Date to Begin Countin22              How to use this chart    One way for your physician to keep track of your baby's health is by knowing how often the baby moves (or \"kicks\") in your womb.  You can help your physician to do this by using this chart every day.    Every day, you should see how many hours it takes for your baby to move 10 times.  Start in the morning, as soon as you get up.    · First, write down the time your baby moves until you get to 10.  · Check off one box every time your baby moves until you get to 10.  · Write down the time you finished counting in the last column.  · Total how long it took to count up all 10 movements.  · Finally, fill in the box that shows how long this took.  After counting 10 movements, you no longer have to count any more that day.  The next morning, just start counting again as soon as you get up.    What should you call a \"movement\"?  It is hard to say, because it will feel different from one mother to another and from one pregnancy to the next.  The important thing is that you count the movements the same way throughout your pregnancy.  If you have more questions, you should ask your physician.    Count carefully every day!  SAMPLE:  Week 28    How many hours did it take to feel 10 movements?       Start  Time     1     2     3     4     5     6     7     8     9     10   Finish Time   Mon 8:20 ·  ·  ·  ·  ·  ·  ·  ·  ·  ·  11:40                  Sat               Sun                 IMPORTANT: You should contact your physician if it takes more than two hours for you to feel 10 movements.  Each morning, write down the time and start to count the movements of your baby.  Keep track by checking off one box every time you feel one movement.  When you have " "felt 10 \"kicks\", write down the time you finished counting in the last column.  Then fill in the   box (over the check sobeida) for the number of hours it took.  Be sure to read the complete instructions on the previous page.            "

## 2022-04-14 NOTE — PROGRESS NOTES
S:  Reports she was in hospital for VB.  Still has placenta previa.  Bleeding has subsided.  Reports a bad HA.  Denies epigastric or blurry vision.  Reports good FM.  Denies VB, LOF, RUCs or vaginal DC.      O:    Vitals:    04/14/22 1018   BP: 124/76   Weight: 99.8 kg (220 lb)     See flow sheet.    A:  IUP at 32w4d  Patient Active Problem List    Diagnosis Date Noted   • Complete placenta previa nos or without hemorrhage, third trimester 04/14/2022   • AMA (advanced maternal age) multigravida 35+, second trimester 11/30/2021   • H/O macrosomia in infant in prior pregnancy, currently pregnant 11/30/2021        P:  1.  GBS @ 36 wks.          2.  Instructions given on FKCs.          3.  Questions answered.          4.  L&D policies reviewed w pt.        5.  Encourage adequate water intake.        6.  F/u 1 wks.        7.  PIH labs ordered.         8.  Tdap given.         9.  BTL declined.      10.  Keep appt as scheduled w [facility] for NSTs and US.      11.  Continue pelvic rest, bleeding precautions reviewed w pt.    I have placed the below orders and discussed them with an approved delegating provider. The MA is performing the below orders under the direction of  Dr. Lee.

## 2022-04-14 NOTE — NON-PROVIDER
Pt here today for OB follow up  Pt states she has a headache for the past two days.   Reports +FM  Good # [phone]  Pharmacy Confirmed.  Chaperone offered and not indicated.  Pt given AKANKSHA sheet and instructions   Pt declines BTL   Pt would like TDAP vaccine, consent signed.   Tdap vaccine given today. RIGHT Deltoid. VIS given and screening check list reviewed with pt.  Tdap vaccine verified by AM

## 2022-04-22 ENCOUNTER — ROUTINE PRENATAL (OUTPATIENT)
Dept: OBGYN | Facility: CLINIC | Age: 37
End: 2022-04-22
Payer: COMMERCIAL

## 2022-04-22 VITALS — WEIGHT: 229.6 LBS | BODY MASS INDEX: 46.37 KG/M2 | DIASTOLIC BLOOD PRESSURE: 60 MMHG | SYSTOLIC BLOOD PRESSURE: 140 MMHG

## 2022-04-22 DIAGNOSIS — O09.522 AMA (ADVANCED MATERNAL AGE) MULTIGRAVIDA 35+, SECOND TRIMESTER: ICD-10-CM

## 2022-04-22 DIAGNOSIS — O44.03 COMPLETE PLACENTA PREVIA NOS OR WITHOUT HEMORRHAGE, THIRD TRIMESTER: ICD-10-CM

## 2022-04-22 DIAGNOSIS — O09.299 H/O MACROSOMIA IN INFANT IN PRIOR PREGNANCY, CURRENTLY PREGNANT: ICD-10-CM

## 2022-04-22 DIAGNOSIS — O09.93 SUPERVISION OF HIGH-RISK PREGNANCY, THIRD TRIMESTER: Primary | ICD-10-CM

## 2022-04-22 PROCEDURE — 90040 PR PRENATAL FOLLOW UP: CPT

## 2022-04-22 ASSESSMENT — FIBROSIS 4 INDEX: FIB4 SCORE: 0.37

## 2022-04-22 NOTE — PROGRESS NOTES
Pt here today for OB follow up  Reports +FM  WT: 229.6 lb  BP: 140/90  Preferred pharmacy verified with pt.  Pt states no complaints or concerns today  Good # [phone]

## 2022-04-22 NOTE — PROGRESS NOTES
S:  Mayte here for routine OB follow up.  Reports good FM.  Denies VB, LOF, RUCs or vaginal DC. Declines follow up ultrasound to look at placenta position again. Has regular follow up with [facility] weekly. Feels comfortable with plan for c/s. No HA, vision changes, or RUQ pain.     O:    Vitals:    04/22/22 1400   BP: 140/60   Weight: 104 kg (229 lb 9.6 oz)       See flow sheet.  4/14: P:C Ratio 189, Plts WNL, LFTs WNL    A:    IUP at 33w5d  S>D  Mild range blood pressure    Patient Active Problem List    Diagnosis Date Noted   • Supervision of high-risk pregnancy, third trimester 04/22/2022   • Complete placenta previa nos or without hemorrhage, third trimester 04/14/2022   • AMA (advanced maternal age) multigravida 35+, second trimester 11/30/2021   • H/O macrosomia in infant in prior pregnancy, currently pregnant 11/30/2021        P:  1. Plan physician visit at next appt for preoperative counseling        2.  Continue FKCs.          3.  Questions answered.          4.  Reviewed PTL precautions        5.  Encourage adequate water intake.        6.  F/u 2 wks and PRN        7.  Continue follow up with [facility]        8.  Complete pelvic rest, modified activity recs        9.  Reviewed importance of immediate RTC to hospital if bleeding occurs or regular UCs    Keesha Steward C.N.M.

## 2022-04-28 DIAGNOSIS — R93.89 ABNORMAL ULTRASOUND: ICD-10-CM

## 2022-04-28 DIAGNOSIS — O09.299 H/O MACROSOMIA IN INFANT IN PRIOR PREGNANCY, CURRENTLY PREGNANT: ICD-10-CM

## 2022-04-28 DIAGNOSIS — O44.20 MARGINAL PLACENTA PREVIA: ICD-10-CM

## 2022-04-28 NOTE — PROGRESS NOTES
Growth US follow up ordered.     Orders Placed This Encounter   • US-OB LIMITED GROWTH FOLLOW UP       Keesha Steward C.N.M.

## 2022-04-29 ENCOUNTER — HOSPITAL ENCOUNTER (OUTPATIENT)
Dept: RADIOLOGY | Facility: MEDICAL CENTER | Age: 37
End: 2022-04-29
Payer: COMMERCIAL

## 2022-04-29 DIAGNOSIS — O09.299 H/O MACROSOMIA IN INFANT IN PRIOR PREGNANCY, CURRENTLY PREGNANT: ICD-10-CM

## 2022-04-29 DIAGNOSIS — O44.20 MARGINAL PLACENTA PREVIA: ICD-10-CM

## 2022-04-29 PROCEDURE — 76817 TRANSVAGINAL US OBSTETRIC: CPT

## 2022-05-06 ENCOUNTER — ROUTINE PRENATAL (OUTPATIENT)
Dept: OBGYN | Facility: CLINIC | Age: 37
End: 2022-05-06
Payer: COMMERCIAL

## 2022-05-06 VITALS — DIASTOLIC BLOOD PRESSURE: 80 MMHG | BODY MASS INDEX: 47.26 KG/M2 | SYSTOLIC BLOOD PRESSURE: 132 MMHG | WEIGHT: 234 LBS

## 2022-05-06 DIAGNOSIS — O44.20 MARGINAL PLACENTA PREVIA: ICD-10-CM

## 2022-05-06 PROCEDURE — 90040 PR PRENATAL FOLLOW UP: CPT | Performed by: OBSTETRICS & GYNECOLOGY

## 2022-05-06 ASSESSMENT — FIBROSIS 4 INDEX: FIB4 SCORE: 0.37

## 2022-05-06 NOTE — PROGRESS NOTES
OB Followup;    35w5d    Patient Active Problem List    Diagnosis Date Noted   • Supervision of high-risk pregnancy, third trimester 2022   • Complete placenta previa nos or without hemorrhage, third trimester 2022   • AMA (advanced maternal age) multigravida 35+, second trimester 2021   • H/O macrosomia in infant in prior pregnancy, currently pregnant 2021       Vitals:    22 0922   BP: 132/80   Weight: 106 kg (234 lb)       Patient presents for followup of OB care. Currently doing well . Good fetal movement no leakage of fluid no contractions or vaginal bleeding        Size equals dates, normal fetal heart rate      Ultrasound/consultation with [facility] on 2022 confirms posterior placenta previa and they recommend delivery at 37-0/7 weeks.  Patient declines permanent sterilization  Referral placed to schedule  section at this time.    Labor precautions given  Discussed proper weight gain during pregnancy.    Signs and symptoms of labor/ labor discussed   Discussed our group's policy on prenatal checkups and delivery  SMFM/ACOG/CDC recommend Covid vaccination for pregnant women-Covid infection during pregnancy results and worse maternal outcomes including greater need for mechanical ventilation and ICU admission and worse fetal outcomes including; possible FGR, increased risk of stillbirth  labor/delivery.  Discussed proper exercise during pregnancy  Discussed proper oral fluid hydration  Reviewed fetal kick counts and appropriate fetal movement during pregnancy  Reviewed postpartum birth control methods  All questions answered in detail    Followup in  1 weeks

## 2022-05-12 ENCOUNTER — ROUTINE PRENATAL (OUTPATIENT)
Dept: OBGYN | Facility: CLINIC | Age: 37
End: 2022-05-12
Payer: COMMERCIAL

## 2022-05-12 ENCOUNTER — HOSPITAL ENCOUNTER (OUTPATIENT)
Facility: MEDICAL CENTER | Age: 37
End: 2022-05-12
Attending: OBSTETRICS & GYNECOLOGY
Payer: COMMERCIAL

## 2022-05-12 VITALS — SYSTOLIC BLOOD PRESSURE: 124 MMHG | WEIGHT: 237 LBS | DIASTOLIC BLOOD PRESSURE: 78 MMHG | BODY MASS INDEX: 47.87 KG/M2

## 2022-05-12 DIAGNOSIS — O09.93 SUPERVISION OF HIGH RISK PREGNANCY IN THIRD TRIMESTER: ICD-10-CM

## 2022-05-12 PROCEDURE — 90040 PR PRENATAL FOLLOW UP: CPT | Performed by: OBSTETRICS & GYNECOLOGY

## 2022-05-12 PROCEDURE — 87081 CULTURE SCREEN ONLY: CPT

## 2022-05-12 PROCEDURE — 87150 DNA/RNA AMPLIFIED PROBE: CPT

## 2022-05-12 ASSESSMENT — FIBROSIS 4 INDEX: FIB4 SCORE: 0.37

## 2022-05-12 NOTE — PROGRESS NOTES
OB follow up   + fetal movement.  No VB, LOF or UC's.  Phone # [phone]  Preferred pharmacy confirmed.  GBS today  C Section scheduled for 5/17/22. Patient was notified.

## 2022-05-13 LAB — GP B STREP DNA SPEC QL NAA+PROBE: POSITIVE

## 2022-05-14 PROBLEM — O99.820 GROUP B STREPTOCOCCAL CARRIAGE COMPLICATING PREGNANCY: Status: ACTIVE | Noted: 2022-05-14

## 2022-05-16 ENCOUNTER — APPOINTMENT (OUTPATIENT)
Dept: ADMISSIONS | Facility: MEDICAL CENTER | Age: 37
End: 2022-05-16
Attending: OBSTETRICS & GYNECOLOGY
Payer: COMMERCIAL

## 2022-05-17 ENCOUNTER — ANESTHESIA EVENT (OUTPATIENT)
Dept: OBGYN | Facility: MEDICAL CENTER | Age: 37
End: 2022-05-17
Payer: COMMERCIAL

## 2022-05-17 ENCOUNTER — HOSPITAL ENCOUNTER (INPATIENT)
Facility: MEDICAL CENTER | Age: 37
LOS: 3 days | End: 2022-05-20
Attending: OBSTETRICS & GYNECOLOGY | Admitting: OBSTETRICS & GYNECOLOGY
Payer: COMMERCIAL

## 2022-05-17 ENCOUNTER — ANESTHESIA (OUTPATIENT)
Dept: OBGYN | Facility: MEDICAL CENTER | Age: 37
End: 2022-05-17
Payer: COMMERCIAL

## 2022-05-17 LAB
ABO GROUP BLD: NORMAL
BASOPHILS # BLD AUTO: 0.4 % (ref 0–1.8)
BASOPHILS # BLD: 0.04 K/UL (ref 0–0.12)
BLD GP AB SCN SERPL QL: NORMAL
EOSINOPHIL # BLD AUTO: 0.15 K/UL (ref 0–0.51)
EOSINOPHIL NFR BLD: 1.3 % (ref 0–6.9)
ERYTHROCYTE [DISTWIDTH] IN BLOOD BY AUTOMATED COUNT: 43.1 FL (ref 35.9–50)
FERRITIN SERPL-MCNC: 6.3 NG/ML (ref 10–291)
HCT VFR BLD AUTO: 28.1 % (ref 37–47)
HGB BLD-MCNC: 8.7 G/DL (ref 12–16)
IMM GRANULOCYTES # BLD AUTO: 0.17 K/UL (ref 0–0.11)
IMM GRANULOCYTES NFR BLD AUTO: 1.5 % (ref 0–0.9)
IRON SATN MFR SERPL: 5 % (ref 15–55)
IRON SERPL-MCNC: 22 UG/DL (ref 40–170)
LYMPHOCYTES # BLD AUTO: 2.2 K/UL (ref 1–4.8)
LYMPHOCYTES NFR BLD: 19.7 % (ref 22–41)
MCH RBC QN AUTO: 23.8 PG (ref 27–33)
MCHC RBC AUTO-ENTMCNC: 31 G/DL (ref 33.6–35)
MCV RBC AUTO: 77 FL (ref 81.4–97.8)
MONOCYTES # BLD AUTO: 0.93 K/UL (ref 0–0.85)
MONOCYTES NFR BLD AUTO: 8.3 % (ref 0–13.4)
NEUTROPHILS # BLD AUTO: 7.65 K/UL (ref 2–7.15)
NEUTROPHILS NFR BLD: 68.8 % (ref 44–72)
NRBC # BLD AUTO: 0 K/UL
NRBC BLD-RTO: 0 /100 WBC
PLATELET # BLD AUTO: 314 K/UL (ref 164–446)
PMV BLD AUTO: 8.7 FL (ref 9–12.9)
RBC # BLD AUTO: 3.65 M/UL (ref 4.2–5.4)
RH BLD: NORMAL
TIBC SERPL-MCNC: 465 UG/DL (ref 250–450)
UIBC SERPL-MCNC: 443 UG/DL (ref 110–370)
WBC # BLD AUTO: 11.1 K/UL (ref 4.8–10.8)

## 2022-05-17 PROCEDURE — 770002 HCHG ROOM/CARE - OB PRIVATE (112)

## 2022-05-17 PROCEDURE — 36415 COLL VENOUS BLD VENIPUNCTURE: CPT

## 2022-05-17 PROCEDURE — 700111 HCHG RX REV CODE 636 W/ 250 OVERRIDE (IP): Performed by: ANESTHESIOLOGY

## 2022-05-17 PROCEDURE — 160036 HCHG PACU - EA ADDL 30 MINS PHASE I: Performed by: OBSTETRICS & GYNECOLOGY

## 2022-05-17 PROCEDURE — 83550 IRON BINDING TEST: CPT

## 2022-05-17 PROCEDURE — 160048 HCHG OR STATISTICAL LEVEL 1-5: Performed by: OBSTETRICS & GYNECOLOGY

## 2022-05-17 PROCEDURE — 700105 HCHG RX REV CODE 258: Performed by: ANESTHESIOLOGY

## 2022-05-17 PROCEDURE — 700102 HCHG RX REV CODE 250 W/ 637 OVERRIDE(OP): Performed by: ANESTHESIOLOGY

## 2022-05-17 PROCEDURE — 160041 HCHG SURGERY MINUTES - EA ADDL 1 MIN LEVEL 4: Performed by: OBSTETRICS & GYNECOLOGY

## 2022-05-17 PROCEDURE — 01961 ANES CESAREAN DELIVERY ONLY: CPT | Performed by: ANESTHESIOLOGY

## 2022-05-17 PROCEDURE — 85025 COMPLETE CBC W/AUTO DIFF WBC: CPT

## 2022-05-17 PROCEDURE — 160029 HCHG SURGERY MINUTES - 1ST 30 MINS LEVEL 4: Performed by: OBSTETRICS & GYNECOLOGY

## 2022-05-17 PROCEDURE — 700102 HCHG RX REV CODE 250 W/ 637 OVERRIDE(OP): Performed by: OBSTETRICS & GYNECOLOGY

## 2022-05-17 PROCEDURE — 700105 HCHG RX REV CODE 258: Performed by: OBSTETRICS & GYNECOLOGY

## 2022-05-17 PROCEDURE — 86850 RBC ANTIBODY SCREEN: CPT

## 2022-05-17 PROCEDURE — 160035 HCHG PACU - 1ST 60 MINS PHASE I: Performed by: OBSTETRICS & GYNECOLOGY

## 2022-05-17 PROCEDURE — 83540 ASSAY OF IRON: CPT

## 2022-05-17 PROCEDURE — 82728 ASSAY OF FERRITIN: CPT

## 2022-05-17 PROCEDURE — 700101 HCHG RX REV CODE 250: Performed by: ANESTHESIOLOGY

## 2022-05-17 PROCEDURE — 160009 HCHG ANES TIME/MIN: Performed by: OBSTETRICS & GYNECOLOGY

## 2022-05-17 PROCEDURE — 86901 BLOOD TYPING SEROLOGIC RH(D): CPT

## 2022-05-17 PROCEDURE — 86900 BLOOD TYPING SEROLOGIC ABO: CPT

## 2022-05-17 PROCEDURE — A9270 NON-COVERED ITEM OR SERVICE: HCPCS | Performed by: OBSTETRICS & GYNECOLOGY

## 2022-05-17 PROCEDURE — A9270 NON-COVERED ITEM OR SERVICE: HCPCS | Performed by: ANESTHESIOLOGY

## 2022-05-17 PROCEDURE — 59515 CESAREAN DELIVERY: CPT | Performed by: OBSTETRICS & GYNECOLOGY

## 2022-05-17 PROCEDURE — 160002 HCHG RECOVERY MINUTES (STAT): Performed by: OBSTETRICS & GYNECOLOGY

## 2022-05-17 RX ORDER — DIPHENHYDRAMINE HYDROCHLORIDE 50 MG/ML
12.5 INJECTION INTRAMUSCULAR; INTRAVENOUS EVERY 6 HOURS PRN
Status: ACTIVE | OUTPATIENT
Start: 2022-05-17 | End: 2022-05-18

## 2022-05-17 RX ORDER — CEFAZOLIN SODIUM 1 G/3ML
2 INJECTION, POWDER, FOR SOLUTION INTRAMUSCULAR; INTRAVENOUS ONCE
Status: DISCONTINUED | OUTPATIENT
Start: 2022-05-17 | End: 2022-05-17 | Stop reason: HOSPADM

## 2022-05-17 RX ORDER — SODIUM CHLORIDE, SODIUM LACTATE, POTASSIUM CHLORIDE, CALCIUM CHLORIDE 600; 310; 30; 20 MG/100ML; MG/100ML; MG/100ML; MG/100ML
INJECTION, SOLUTION INTRAVENOUS PRN
Status: DISCONTINUED | OUTPATIENT
Start: 2022-05-17 | End: 2022-05-20 | Stop reason: HOSPADM

## 2022-05-17 RX ORDER — OXYCODONE HCL 5 MG/5 ML
10 SOLUTION, ORAL ORAL
Status: DISCONTINUED | OUTPATIENT
Start: 2022-05-17 | End: 2022-05-17 | Stop reason: HOSPADM

## 2022-05-17 RX ORDER — KETOROLAC TROMETHAMINE 30 MG/ML
30 INJECTION, SOLUTION INTRAMUSCULAR; INTRAVENOUS EVERY 6 HOURS
Status: COMPLETED | OUTPATIENT
Start: 2022-05-18 | End: 2022-05-18

## 2022-05-17 RX ORDER — METHYLERGONOVINE MALEATE 0.2 MG/ML
INJECTION INTRAVENOUS
Status: DISCONTINUED
Start: 2022-05-17 | End: 2022-05-17

## 2022-05-17 RX ORDER — ONDANSETRON 2 MG/ML
4 INJECTION INTRAMUSCULAR; INTRAVENOUS
Status: DISCONTINUED | OUTPATIENT
Start: 2022-05-17 | End: 2022-05-17 | Stop reason: HOSPADM

## 2022-05-17 RX ORDER — DOCUSATE SODIUM 100 MG/1
100 CAPSULE, LIQUID FILLED ORAL 2 TIMES DAILY PRN
Status: DISCONTINUED | OUTPATIENT
Start: 2022-05-17 | End: 2022-05-20 | Stop reason: HOSPADM

## 2022-05-17 RX ORDER — ALBUTEROL SULFATE 2.5 MG/3ML
2.5 SOLUTION RESPIRATORY (INHALATION)
Status: DISCONTINUED | OUTPATIENT
Start: 2022-05-17 | End: 2022-05-17 | Stop reason: HOSPADM

## 2022-05-17 RX ORDER — BUPIVACAINE HYDROCHLORIDE 7.5 MG/ML
INJECTION, SOLUTION INTRASPINAL PRN
Status: DISCONTINUED | OUTPATIENT
Start: 2022-05-17 | End: 2022-05-17 | Stop reason: SURG

## 2022-05-17 RX ORDER — SODIUM CHLORIDE, SODIUM GLUCONATE, SODIUM ACETATE, POTASSIUM CHLORIDE AND MAGNESIUM CHLORIDE 526; 502; 368; 37; 30 MG/100ML; MG/100ML; MG/100ML; MG/100ML; MG/100ML
1500 INJECTION, SOLUTION INTRAVENOUS ONCE
Status: COMPLETED | OUTPATIENT
Start: 2022-05-17 | End: 2022-05-17

## 2022-05-17 RX ORDER — MEPERIDINE HYDROCHLORIDE 25 MG/ML
12.5 INJECTION INTRAMUSCULAR; INTRAVENOUS; SUBCUTANEOUS
Status: DISCONTINUED | OUTPATIENT
Start: 2022-05-17 | End: 2022-05-17 | Stop reason: HOSPADM

## 2022-05-17 RX ORDER — MORPHINE SULFATE 0.5 MG/ML
INJECTION, SOLUTION EPIDURAL; INTRATHECAL; INTRAVENOUS PRN
Status: DISCONTINUED | OUTPATIENT
Start: 2022-05-17 | End: 2022-05-17 | Stop reason: HOSPADM

## 2022-05-17 RX ORDER — HALOPERIDOL 5 MG/ML
1 INJECTION INTRAMUSCULAR
Status: DISCONTINUED | OUTPATIENT
Start: 2022-05-17 | End: 2022-05-17 | Stop reason: HOSPADM

## 2022-05-17 RX ORDER — SODIUM CHLORIDE, SODIUM LACTATE, POTASSIUM CHLORIDE, CALCIUM CHLORIDE 600; 310; 30; 20 MG/100ML; MG/100ML; MG/100ML; MG/100ML
INJECTION, SOLUTION INTRAVENOUS CONTINUOUS
Status: DISCONTINUED | OUTPATIENT
Start: 2022-05-17 | End: 2022-05-20 | Stop reason: HOSPADM

## 2022-05-17 RX ORDER — ACETAMINOPHEN 500 MG
1000 TABLET ORAL EVERY 6 HOURS
Status: COMPLETED | OUTPATIENT
Start: 2022-05-17 | End: 2022-05-18

## 2022-05-17 RX ORDER — KETOROLAC TROMETHAMINE 30 MG/ML
INJECTION, SOLUTION INTRAMUSCULAR; INTRAVENOUS PRN
Status: DISCONTINUED | OUTPATIENT
Start: 2022-05-17 | End: 2022-05-17 | Stop reason: SURG

## 2022-05-17 RX ORDER — OXYCODONE HCL 5 MG/5 ML
5 SOLUTION, ORAL ORAL
Status: DISCONTINUED | OUTPATIENT
Start: 2022-05-17 | End: 2022-05-17 | Stop reason: HOSPADM

## 2022-05-17 RX ORDER — OXYCODONE HYDROCHLORIDE 5 MG/1
10 TABLET ORAL EVERY 4 HOURS PRN
Status: ACTIVE | OUTPATIENT
Start: 2022-05-17 | End: 2022-05-18

## 2022-05-17 RX ORDER — ONDANSETRON 2 MG/ML
INJECTION INTRAMUSCULAR; INTRAVENOUS PRN
Status: DISCONTINUED | OUTPATIENT
Start: 2022-05-17 | End: 2022-05-17 | Stop reason: SURG

## 2022-05-17 RX ORDER — CITRIC ACID/SODIUM CITRATE 334-500MG
30 SOLUTION, ORAL ORAL ONCE
Status: COMPLETED | OUTPATIENT
Start: 2022-05-17 | End: 2022-05-17

## 2022-05-17 RX ORDER — OXYTOCIN 10 [USP'U]/ML
INJECTION, SOLUTION INTRAMUSCULAR; INTRAVENOUS PRN
Status: DISCONTINUED | OUTPATIENT
Start: 2022-05-17 | End: 2022-05-17 | Stop reason: SURG

## 2022-05-17 RX ORDER — ONDANSETRON 2 MG/ML
4 INJECTION INTRAMUSCULAR; INTRAVENOUS EVERY 6 HOURS PRN
Status: ACTIVE | OUTPATIENT
Start: 2022-05-17 | End: 2022-05-18

## 2022-05-17 RX ORDER — HYDROMORPHONE HYDROCHLORIDE 1 MG/ML
0.4 INJECTION, SOLUTION INTRAMUSCULAR; INTRAVENOUS; SUBCUTANEOUS
Status: ACTIVE | OUTPATIENT
Start: 2022-05-17 | End: 2022-05-18

## 2022-05-17 RX ORDER — HYDROMORPHONE HYDROCHLORIDE 1 MG/ML
0.2 INJECTION, SOLUTION INTRAMUSCULAR; INTRAVENOUS; SUBCUTANEOUS
Status: ACTIVE | OUTPATIENT
Start: 2022-05-17 | End: 2022-05-18

## 2022-05-17 RX ORDER — SODIUM CHLORIDE, SODIUM LACTATE, POTASSIUM CHLORIDE, CALCIUM CHLORIDE 600; 310; 30; 20 MG/100ML; MG/100ML; MG/100ML; MG/100ML
INJECTION, SOLUTION INTRAVENOUS CONTINUOUS
Status: DISCONTINUED | OUTPATIENT
Start: 2022-05-17 | End: 2022-05-17

## 2022-05-17 RX ORDER — MISOPROSTOL 200 UG/1
TABLET ORAL
Status: DISCONTINUED
Start: 2022-05-17 | End: 2022-05-17

## 2022-05-17 RX ORDER — METOCLOPRAMIDE HYDROCHLORIDE 5 MG/ML
10 INJECTION INTRAMUSCULAR; INTRAVENOUS ONCE
Status: COMPLETED | OUTPATIENT
Start: 2022-05-17 | End: 2022-05-17

## 2022-05-17 RX ORDER — DIPHENHYDRAMINE HYDROCHLORIDE 50 MG/ML
12.5 INJECTION INTRAMUSCULAR; INTRAVENOUS
Status: DISCONTINUED | OUTPATIENT
Start: 2022-05-17 | End: 2022-05-17 | Stop reason: HOSPADM

## 2022-05-17 RX ORDER — DIPHENHYDRAMINE HYDROCHLORIDE 50 MG/ML
25 INJECTION INTRAMUSCULAR; INTRAVENOUS EVERY 6 HOURS PRN
Status: ACTIVE | OUTPATIENT
Start: 2022-05-17 | End: 2022-05-18

## 2022-05-17 RX ORDER — MIDAZOLAM HYDROCHLORIDE 1 MG/ML
1 INJECTION INTRAMUSCULAR; INTRAVENOUS
Status: DISCONTINUED | OUTPATIENT
Start: 2022-05-17 | End: 2022-05-17 | Stop reason: HOSPADM

## 2022-05-17 RX ORDER — OXYCODONE HYDROCHLORIDE 5 MG/1
5 TABLET ORAL EVERY 4 HOURS PRN
Status: DISPENSED | OUTPATIENT
Start: 2022-05-17 | End: 2022-05-18

## 2022-05-17 RX ORDER — CEFAZOLIN SODIUM 1 G/3ML
INJECTION, POWDER, FOR SOLUTION INTRAMUSCULAR; INTRAVENOUS PRN
Status: DISCONTINUED | OUTPATIENT
Start: 2022-05-17 | End: 2022-05-17 | Stop reason: SURG

## 2022-05-17 RX ADMIN — DOCUSATE SODIUM 100 MG: 100 CAPSULE, LIQUID FILLED ORAL at 22:30

## 2022-05-17 RX ADMIN — PHENYLEPHRINE HYDROCHLORIDE 20 MCG/MIN: 10 INJECTION INTRAVENOUS at 16:53

## 2022-05-17 RX ADMIN — MORPHINE SULFATE 0.15 MG: 0.5 INJECTION EPIDURAL; INTRATHECAL; INTRAVENOUS at 16:49

## 2022-05-17 RX ADMIN — MORPHINE SULFATE 1.5 MG: 0.5 INJECTION EPIDURAL; INTRATHECAL; INTRAVENOUS at 17:19

## 2022-05-17 RX ADMIN — OXYTOCIN 20 UNITS: 10 INJECTION, SOLUTION INTRAMUSCULAR; INTRAVENOUS at 17:05

## 2022-05-17 RX ADMIN — OXYTOCIN 20 UNITS: 10 INJECTION, SOLUTION INTRAMUSCULAR; INTRAVENOUS at 17:35

## 2022-05-17 RX ADMIN — SODIUM CHLORIDE, POTASSIUM CHLORIDE, SODIUM LACTATE AND CALCIUM CHLORIDE: 600; 310; 30; 20 INJECTION, SOLUTION INTRAVENOUS at 16:35

## 2022-05-17 RX ADMIN — ACETAMINOPHEN 1000 MG: 500 TABLET ORAL at 22:29

## 2022-05-17 RX ADMIN — SODIUM CITRATE AND CITRIC ACID MONOHYDRATE 30 ML: 500; 334 SOLUTION ORAL at 09:07

## 2022-05-17 RX ADMIN — CEFAZOLIN 3 G: 330 INJECTION, POWDER, FOR SOLUTION INTRAMUSCULAR; INTRAVENOUS at 16:56

## 2022-05-17 RX ADMIN — FENTANYL CITRATE 50 MCG: 50 INJECTION, SOLUTION INTRAMUSCULAR; INTRAVENOUS at 19:08

## 2022-05-17 RX ADMIN — KETOROLAC TROMETHAMINE 30 MG: 30 INJECTION, SOLUTION INTRAMUSCULAR at 17:43

## 2022-05-17 RX ADMIN — MORPHINE SULFATE 1.85 MG: 0.5 INJECTION EPIDURAL; INTRATHECAL; INTRAVENOUS at 17:22

## 2022-05-17 RX ADMIN — FAMOTIDINE 20 MG: 10 INJECTION, SOLUTION INTRAVENOUS at 09:08

## 2022-05-17 RX ADMIN — MORPHINE SULFATE 1.5 MG: 0.5 INJECTION EPIDURAL; INTRATHECAL; INTRAVENOUS at 17:16

## 2022-05-17 RX ADMIN — OXYCODONE 5 MG: 5 TABLET ORAL at 22:29

## 2022-05-17 RX ADMIN — BUPIVACAINE HYDROCHLORIDE IN DEXTROSE 12 MG: 7.5 INJECTION, SOLUTION SUBARACHNOID at 16:49

## 2022-05-17 RX ADMIN — SODIUM CHLORIDE, SODIUM GLUCONATE, SODIUM ACETATE, POTASSIUM CHLORIDE AND MAGNESIUM CHLORIDE 1500 ML: 526; 502; 368; 37; 30 INJECTION, SOLUTION INTRAVENOUS at 08:45

## 2022-05-17 RX ADMIN — ONDANSETRON 4 MG: 2 INJECTION INTRAMUSCULAR; INTRAVENOUS at 17:23

## 2022-05-17 RX ADMIN — METOCLOPRAMIDE 10 MG: 5 INJECTION, SOLUTION INTRAMUSCULAR; INTRAVENOUS at 09:07

## 2022-05-17 ASSESSMENT — PATIENT HEALTH QUESTIONNAIRE - PHQ9
2. FEELING DOWN, DEPRESSED, IRRITABLE, OR HOPELESS: NOT AT ALL
SUM OF ALL RESPONSES TO PHQ9 QUESTIONS 1 AND 2: 0
1. LITTLE INTEREST OR PLEASURE IN DOING THINGS: NOT AT ALL

## 2022-05-17 ASSESSMENT — FIBROSIS 4 INDEX: FIB4 SCORE: 0.37

## 2022-05-17 ASSESSMENT — LIFESTYLE VARIABLES
AVERAGE NUMBER OF DAYS PER WEEK YOU HAVE A DRINK CONTAINING ALCOHOL: 0
ON A TYPICAL DAY WHEN YOU DRINK ALCOHOL HOW MANY DRINKS DO YOU HAVE: 0
DOES PATIENT WANT TO STOP DRINKING: NO
HOW MANY TIMES IN THE PAST YEAR HAVE YOU HAD 5 OR MORE DRINKS IN A DAY: 0
TOTAL SCORE: 0
HAVE YOU EVER FELT YOU SHOULD CUT DOWN ON YOUR DRINKING: NO
ALCOHOL_USE: NO
TOTAL SCORE: 0
HAVE PEOPLE ANNOYED YOU BY CRITICIZING YOUR DRINKING: NO
EVER FELT BAD OR GUILTY ABOUT YOUR DRINKING: NO
CONSUMPTION TOTAL: NEGATIVE
TOTAL SCORE: 0
EVER HAD A DRINK FIRST THING IN THE MORNING TO STEADY YOUR NERVES TO GET RID OF A HANGOVER: NO
EVER_SMOKED: NEVER

## 2022-05-17 ASSESSMENT — COPD QUESTIONNAIRES
COPD SCREENING SCORE: 0
HAVE YOU SMOKED AT LEAST 100 CIGARETTES IN YOUR ENTIRE LIFE: NO/DON'T KNOW
DURING THE PAST 4 WEEKS HOW MUCH DID YOU FEEL SHORT OF BREATH: NONE/LITTLE OF THE TIME
DO YOU EVER COUGH UP ANY MUCUS OR PHLEGM?: NO/ONLY WITH OCCASIONAL COLDS OR INFECTIONS
IN THE PAST 12 MONTHS DO YOU DO LESS THAN YOU USED TO BECAUSE OF YOUR BREATHING PROBLEMS: DISAGREE/UNSURE

## 2022-05-17 ASSESSMENT — PAIN SCALES - GENERAL
PAINLEVEL: 0 - NO PAIN
PAIN_LEVEL: 0

## 2022-05-17 ASSESSMENT — PAIN DESCRIPTION - PAIN TYPE
TYPE: SURGICAL PAIN
TYPE: ACUTE PAIN

## 2022-05-17 NOTE — ANESTHESIA PREPROCEDURE EVALUATION
Case: [id number] Date/Time: 2215    Procedure:  SECTION, PRIMARY    Pre-op diagnosis: PLACENTA PREVIA, 37+2    Location: [facility] OR 01 / SURGERY LABOR AND DELIVERY    Surgeons: Iza Gastelum M.D.          Relevant Problems   NEURO   (positive) H/O macrosomia in infant in prior pregnancy, currently pregnant       Physical Exam    Airway   Mallampati: II  TM distance: >3 FB  Neck ROM: full       Cardiovascular - normal exam  Rhythm: regular  Rate: normal  (-) murmur     Dental - normal exam           Pulmonary - normal exam  Breath sounds clear to auscultation     Abdominal    Neurological - normal exam                 Anesthesia Plan    ASA 2       Plan - spinal   Neuraxial block will be primary anesthetic                Postoperative Plan: Postoperative administration of opioids is intended.    Pertinent diagnostic labs and testing reviewed    Informed Consent:    Anesthetic plan and risks discussed with patient.

## 2022-05-17 NOTE — CARE PLAN
The patient is Watcher - Medium risk of patient condition declining or worsening         Progress made toward(s) clinical / shift goals:    Problem: Knowledge Deficit - L&D  Goal: Patient and family/caregivers will demonstrate understanding of plan of care, disease process/condition, diagnostic tests and medications  Outcome: Progressing     Problem: Knowledge Deficit -   Goal: Patient/support person will demonstrate understanding regarding  section  Outcome: Progressing     Problem: Pain  Goal: Patient's pain will be alleviated or reduced to the patient’s comfort goal  Outcome: Progressing     Problem: Risk for Infection and Impaired Wound Healing  Goal: Patient will remain free from infection  Outcome: Progressing     Problem: Risk for Venous Thromboembolism (VTE)  Goal: VTE prevention measures will be implemented and patient will remain free from VTE  Outcome: Progressing     Problem: Respiratory  Goal: Patient will achieve/maintain optimum respiratory ventilation and gas   Outcome: Progressing

## 2022-05-17 NOTE — H&P
OB H&P:    CC: Elective C/S 2/2 lowing-lying placenta    HPI:  Ms. Mayte Spivey is a 36 y.o.  @ 37w2d with ANIL 2022 (by 11w US = LMP) presents to L&D for scheduled C/S 2/2 lowing-lying placenta. Pregnancy complicated by AMA and anemia.    Past medical history significant for anemia.  The patient reported that prior to her previous vaginal delivery she was administered 7 units of blood approximately a month before.  She says that she has not otherwise been worked up for the anemia and does not know the underlying cause of her anemia.    Contractions: No   Loss of fluid: No   Vaginal bleeding: No   Fetal movement: present    PNC with RW and [facility]    PNL: RWH  Rh+, RI, HIV neg, TrepAb neg, HBsAg NR, GC/CT neg/neg  Glucola: WNL  GBS +    Fetal anatomy US (22)  IMPRESSION:  Single intrauterine pregnancy of an estimated gestational age of 20 weeks 1 day with an estimated date of delivery of 2022.  Stomach is not visualized.  Possible aneurysmal foramen ovale.  Marginal placenta previa.    ROS negative unless stated in HPI    OB History    Para Term  AB Living   2 1 1     1   SAB IAB Ectopic Molar Multiple Live Births             1      # Outcome Date GA Lbr Edward/2nd Weight Sex Delivery Anes PTL Lv   2 Current            1 Term 09 41w0d  4.451 kg (9 lb 13 oz) M Vag-Spont  N LIVIA       GYN: denies STIs, no cervical procedures    Past Medical History:   Diagnosis Date   • Blood transfusion without reported diagnosis    • Pregnant        Past Surgical History:   Procedure Laterality Date   • ABDOMINAL EXPLORATION     • CHOLECYSTECTOMY         No current facility-administered medications on file prior to encounter.     Current Outpatient Medications on File Prior to Encounter   Medication Sig Dispense Refill   • PRENATAL VIT-DOCUSATE-IRON-FA PO Take  by mouth. (Patient not taking: No sig reported)     • aspirin EC (ECOTRIN) 81 MG Tablet Delayed Response Take 81 mg by mouth every  day.         Family History   Problem Relation Age of Onset   • Cancer Father    • Prostate cancer Father        Social History     Tobacco Use   • Smoking status: Never Smoker   • Smokeless tobacco: Never Used   Vaping Use   • Vaping Use: Never used   Substance Use Topics   • Alcohol use: Not Currently   • Drug use: Not Currently     Types: Marijuana       PE:  Vitals:    22 0817   BP: 120/77   Pulse: (!) 111   Resp: 17   Temp: 36.2 °C (97.2 °F)   TempSrc: Temporal   SpO2: 96%     gen: AAO, NAD  abd: soft, gravid, NT, EFW Percentile: 47%  Ext: NT, No edema    SVE: Deferred  FHT: 130/Moderate variability/+ accels/ - decels  Grady: Present, irregular, q 2 min    A/P: Ms. Mayte Spivey is a 36 y.o.  @ 37w2d with ANIL 2022 (by 11w US = LMP) presents to L&D for scheduled C/S 2/2 low-lying placenta. Pregnancy complicated by AMA and anemia.    -Admit to L&D for scheduled  secondary to low-lying placenta  - Spinal for pain management  - N.p.o.  - 4 units of blood available in the OR as patient has a known history of anemia without identified etiology, hemoglobin A1c today 8.7. TIBC and ferritin ordered this AM.  We will start iron postpartum.      Mary Cohen M.D.

## 2022-05-17 NOTE — PROGRESS NOTES
0740 Patient arrived to [facility]. EFM and TOCO applied. Patient sitting comfortably with support at bedside.    0945 Patient prepped for . Dr. Gastelum and Dr. Cohen at bedside to assess patient and informed patient of delay due to other patients delivering.     0145 Dr. Gastelum at bedside to inform patient of delay due to other patients delivering. Patient laying comfortably with support at bedside.     1634 Patient ambulates from [facility] to OR1 with this RN.    1704 Delivery of viable baby girl.     1749 Patient transferred to PACU bed 1 in stable condition. Fundus firm, bleeding light.    1855 Report given to Essence GRANDE. All patient questions answered.

## 2022-05-18 LAB
ALBUMIN SERPL BCP-MCNC: 2.7 G/DL (ref 3.2–4.9)
ALBUMIN/GLOB SERPL: 0.9 G/DL
ALP SERPL-CCNC: 93 U/L (ref 30–99)
ALT SERPL-CCNC: 7 U/L (ref 2–50)
ANION GAP SERPL CALC-SCNC: 10 MMOL/L (ref 7–16)
AST SERPL-CCNC: 19 U/L (ref 12–45)
BASOPHILS # BLD AUTO: 0.5 % (ref 0–1.8)
BASOPHILS # BLD AUTO: 0.6 % (ref 0–1.8)
BASOPHILS # BLD: 0.05 K/UL (ref 0–0.12)
BASOPHILS # BLD: 0.08 K/UL (ref 0–0.12)
BILIRUB SERPL-MCNC: 0.2 MG/DL (ref 0.1–1.5)
BUN SERPL-MCNC: 23 MG/DL (ref 8–22)
CALCIUM SERPL-MCNC: 9.2 MG/DL (ref 8.5–10.5)
CHLORIDE SERPL-SCNC: 103 MMOL/L (ref 96–112)
CO2 SERPL-SCNC: 22 MMOL/L (ref 20–33)
CREAT SERPL-MCNC: 0.67 MG/DL (ref 0.5–1.4)
EOSINOPHIL # BLD AUTO: 0.06 K/UL (ref 0–0.51)
EOSINOPHIL # BLD AUTO: 0.19 K/UL (ref 0–0.51)
EOSINOPHIL NFR BLD: 0.5 % (ref 0–6.9)
EOSINOPHIL NFR BLD: 1.7 % (ref 0–6.9)
ERYTHROCYTE [DISTWIDTH] IN BLOOD BY AUTOMATED COUNT: 44.5 FL (ref 35.9–50)
ERYTHROCYTE [DISTWIDTH] IN BLOOD BY AUTOMATED COUNT: 46.8 FL (ref 35.9–50)
ERYTHROCYTE [DISTWIDTH] IN BLOOD BY AUTOMATED COUNT: 46.8 FL (ref 35.9–50)
GFR SERPLBLD CREATININE-BSD FMLA CKD-EPI: 115 ML/MIN/1.73 M 2
GLOBULIN SER CALC-MCNC: 2.9 G/DL (ref 1.9–3.5)
GLUCOSE SERPL-MCNC: 96 MG/DL (ref 65–99)
HCT VFR BLD AUTO: 23 % (ref 37–47)
HCT VFR BLD AUTO: 28 % (ref 37–47)
HCT VFR BLD AUTO: 28 % (ref 37–47)
HGB BLD-MCNC: 7 G/DL (ref 12–16)
HGB BLD-MCNC: 8.3 G/DL (ref 12–16)
HGB BLD-MCNC: 8.3 G/DL (ref 12–16)
IMM GRANULOCYTES # BLD AUTO: 0.09 K/UL (ref 0–0.11)
IMM GRANULOCYTES # BLD AUTO: 0.12 K/UL (ref 0–0.11)
IMM GRANULOCYTES NFR BLD AUTO: 0.7 % (ref 0–0.9)
IMM GRANULOCYTES NFR BLD AUTO: 1.1 % (ref 0–0.9)
LYMPHOCYTES # BLD AUTO: 1.94 K/UL (ref 1–4.8)
LYMPHOCYTES # BLD AUTO: 2.3 K/UL (ref 1–4.8)
LYMPHOCYTES NFR BLD: 14.9 % (ref 22–41)
LYMPHOCYTES NFR BLD: 20.8 % (ref 22–41)
MCH RBC QN AUTO: 23.4 PG (ref 27–33)
MCH RBC QN AUTO: 24.1 PG (ref 27–33)
MCH RBC QN AUTO: 24.1 PG (ref 27–33)
MCHC RBC AUTO-ENTMCNC: 29.6 G/DL (ref 33.6–35)
MCHC RBC AUTO-ENTMCNC: 29.6 G/DL (ref 33.6–35)
MCHC RBC AUTO-ENTMCNC: 30.4 G/DL (ref 33.6–35)
MCV RBC AUTO: 76.9 FL (ref 81.4–97.8)
MCV RBC AUTO: 81.2 FL (ref 81.4–97.8)
MCV RBC AUTO: 81.2 FL (ref 81.4–97.8)
MONOCYTES # BLD AUTO: 0.92 K/UL (ref 0–0.85)
MONOCYTES # BLD AUTO: 1.12 K/UL (ref 0–0.85)
MONOCYTES NFR BLD AUTO: 8.3 % (ref 0–13.4)
MONOCYTES NFR BLD AUTO: 8.6 % (ref 0–13.4)
MORPHOLOGY BLD-IMP: NORMAL
NEUTROPHILS # BLD AUTO: 7.46 K/UL (ref 2–7.15)
NEUTROPHILS # BLD AUTO: 9.71 K/UL (ref 2–7.15)
NEUTROPHILS NFR BLD: 67.6 % (ref 44–72)
NEUTROPHILS NFR BLD: 74.7 % (ref 44–72)
NRBC # BLD AUTO: 0 K/UL
NRBC # BLD AUTO: 0 K/UL
NRBC BLD-RTO: 0 /100 WBC
NRBC BLD-RTO: 0 /100 WBC
PLATELET # BLD AUTO: 261 K/UL (ref 164–446)
PLATELET # BLD AUTO: 286 K/UL (ref 164–446)
PLATELET # BLD AUTO: 286 K/UL (ref 164–446)
PMV BLD AUTO: 8.9 FL (ref 9–12.9)
PMV BLD AUTO: 9 FL (ref 9–12.9)
PMV BLD AUTO: 9 FL (ref 9–12.9)
POTASSIUM SERPL-SCNC: 4.5 MMOL/L (ref 3.6–5.5)
PROT SERPL-MCNC: 5.6 G/DL (ref 6–8.2)
RBC # BLD AUTO: 2.99 M/UL (ref 4.2–5.4)
RBC # BLD AUTO: 3.45 M/UL (ref 4.2–5.4)
RBC # BLD AUTO: 3.45 M/UL (ref 4.2–5.4)
SODIUM SERPL-SCNC: 135 MMOL/L (ref 135–145)
WBC # BLD AUTO: 11 K/UL (ref 4.8–10.8)
WBC # BLD AUTO: 13.4 K/UL (ref 4.8–10.8)
WBC # BLD AUTO: 13.4 K/UL (ref 4.8–10.8)

## 2022-05-18 PROCEDURE — A9270 NON-COVERED ITEM OR SERVICE: HCPCS | Performed by: ANESTHESIOLOGY

## 2022-05-18 PROCEDURE — 36415 COLL VENOUS BLD VENIPUNCTURE: CPT

## 2022-05-18 PROCEDURE — 700102 HCHG RX REV CODE 250 W/ 637 OVERRIDE(OP): Performed by: ANESTHESIOLOGY

## 2022-05-18 PROCEDURE — 80053 COMPREHEN METABOLIC PANEL: CPT

## 2022-05-18 PROCEDURE — 770002 HCHG ROOM/CARE - OB PRIVATE (112)

## 2022-05-18 PROCEDURE — 700102 HCHG RX REV CODE 250 W/ 637 OVERRIDE(OP): Performed by: OBSTETRICS & GYNECOLOGY

## 2022-05-18 PROCEDURE — 85025 COMPLETE CBC W/AUTO DIFF WBC: CPT

## 2022-05-18 PROCEDURE — 700105 HCHG RX REV CODE 258: Performed by: OBSTETRICS & GYNECOLOGY

## 2022-05-18 PROCEDURE — A9270 NON-COVERED ITEM OR SERVICE: HCPCS | Performed by: OBSTETRICS & GYNECOLOGY

## 2022-05-18 PROCEDURE — 700111 HCHG RX REV CODE 636 W/ 250 OVERRIDE (IP): Performed by: ANESTHESIOLOGY

## 2022-05-18 RX ORDER — ONDANSETRON 4 MG/1
4 TABLET, ORALLY DISINTEGRATING ORAL EVERY 6 HOURS PRN
Status: DISCONTINUED | OUTPATIENT
Start: 2022-05-19 | End: 2022-05-20 | Stop reason: HOSPADM

## 2022-05-18 RX ORDER — DIPHENHYDRAMINE HCL 25 MG
25 TABLET ORAL EVERY 6 HOURS PRN
Status: DISCONTINUED | OUTPATIENT
Start: 2022-05-19 | End: 2022-05-20 | Stop reason: HOSPADM

## 2022-05-18 RX ORDER — OXYCODONE HYDROCHLORIDE 5 MG/1
10 TABLET ORAL EVERY 4 HOURS PRN
Status: DISCONTINUED | OUTPATIENT
Start: 2022-05-19 | End: 2022-05-20 | Stop reason: HOSPADM

## 2022-05-18 RX ORDER — ONDANSETRON 2 MG/ML
4 INJECTION INTRAMUSCULAR; INTRAVENOUS EVERY 6 HOURS PRN
Status: DISCONTINUED | OUTPATIENT
Start: 2022-05-19 | End: 2022-05-20 | Stop reason: HOSPADM

## 2022-05-18 RX ORDER — FERROUS SULFATE 325(65) MG
325 TABLET ORAL
Status: DISCONTINUED | OUTPATIENT
Start: 2022-05-19 | End: 2022-05-20 | Stop reason: HOSPADM

## 2022-05-18 RX ORDER — ACETAMINOPHEN 500 MG
1000 TABLET ORAL EVERY 6 HOURS PRN
Status: DISCONTINUED | OUTPATIENT
Start: 2022-05-22 | End: 2022-05-19

## 2022-05-18 RX ORDER — OXYCODONE HYDROCHLORIDE 5 MG/1
5 TABLET ORAL EVERY 4 HOURS PRN
Status: DISCONTINUED | OUTPATIENT
Start: 2022-05-19 | End: 2022-05-20 | Stop reason: HOSPADM

## 2022-05-18 RX ORDER — OXYTOCIN 10 [USP'U]/ML
10 INJECTION, SOLUTION INTRAMUSCULAR; INTRAVENOUS
Status: DISCONTINUED | OUTPATIENT
Start: 2022-05-18 | End: 2022-05-20 | Stop reason: HOSPADM

## 2022-05-18 RX ORDER — DIPHENHYDRAMINE HYDROCHLORIDE 50 MG/ML
25 INJECTION INTRAMUSCULAR; INTRAVENOUS EVERY 6 HOURS PRN
Status: DISCONTINUED | OUTPATIENT
Start: 2022-05-19 | End: 2022-05-20 | Stop reason: HOSPADM

## 2022-05-18 RX ORDER — ACETAMINOPHEN 500 MG
1000 TABLET ORAL EVERY 6 HOURS
Status: DISCONTINUED | OUTPATIENT
Start: 2022-05-19 | End: 2022-05-20 | Stop reason: HOSPADM

## 2022-05-18 RX ORDER — IBUPROFEN 800 MG/1
800 TABLET ORAL EVERY 8 HOURS PRN
Status: DISCONTINUED | OUTPATIENT
Start: 2022-05-21 | End: 2022-05-19

## 2022-05-18 RX ORDER — SODIUM CHLORIDE, SODIUM LACTATE, POTASSIUM CHLORIDE, CALCIUM CHLORIDE 600; 310; 30; 20 MG/100ML; MG/100ML; MG/100ML; MG/100ML
INJECTION, SOLUTION INTRAVENOUS ONCE
Status: COMPLETED | OUTPATIENT
Start: 2022-05-18 | End: 2022-05-18

## 2022-05-18 RX ORDER — IBUPROFEN 800 MG/1
800 TABLET ORAL EVERY 8 HOURS
Status: DISCONTINUED | OUTPATIENT
Start: 2022-05-19 | End: 2022-05-20 | Stop reason: HOSPADM

## 2022-05-18 RX ADMIN — ACETAMINOPHEN 1000 MG: 500 TABLET ORAL at 17:09

## 2022-05-18 RX ADMIN — KETOROLAC TROMETHAMINE 30 MG: 30 INJECTION, SOLUTION INTRAMUSCULAR at 20:52

## 2022-05-18 RX ADMIN — ACETAMINOPHEN 1000 MG: 500 TABLET ORAL at 04:44

## 2022-05-18 RX ADMIN — ACETAMINOPHEN 1000 MG: 500 TABLET ORAL at 10:50

## 2022-05-18 RX ADMIN — ACETAMINOPHEN 1000 MG: 500 TABLET ORAL at 23:53

## 2022-05-18 RX ADMIN — KETOROLAC TROMETHAMINE 30 MG: 30 INJECTION, SOLUTION INTRAMUSCULAR at 14:18

## 2022-05-18 RX ADMIN — SODIUM CHLORIDE, POTASSIUM CHLORIDE, SODIUM LACTATE AND CALCIUM CHLORIDE 500 ML: 600; 310; 30; 20 INJECTION, SOLUTION INTRAVENOUS at 17:10

## 2022-05-18 RX ADMIN — KETOROLAC TROMETHAMINE 30 MG: 30 INJECTION, SOLUTION INTRAMUSCULAR at 00:04

## 2022-05-18 RX ADMIN — KETOROLAC TROMETHAMINE 30 MG: 30 INJECTION, SOLUTION INTRAMUSCULAR at 05:45

## 2022-05-18 ASSESSMENT — EDINBURGH POSTNATAL DEPRESSION SCALE (EPDS)
I HAVE BEEN SO UNHAPPY THAT I HAVE BEEN CRYING: NO, NEVER
I HAVE FELT SAD OR MISERABLE: NO, NOT AT ALL
I HAVE BEEN ABLE TO LAUGH AND SEE THE FUNNY SIDE OF THINGS: AS MUCH AS I ALWAYS COULD
THE THOUGHT OF HARMING MYSELF HAS OCCURRED TO ME: NEVER
I HAVE FELT SCARED OR PANICKY FOR NO GOOD REASON: NO, NOT AT ALL
THINGS HAVE BEEN GETTING ON TOP OF ME: NO, I HAVE BEEN COPING AS WELL AS EVER
I HAVE BLAMED MYSELF UNNECESSARILY WHEN THINGS WENT WRONG: NO, NEVER
I HAVE LOOKED FORWARD WITH ENJOYMENT TO THINGS: AS MUCH AS I EVER DID
I HAVE BEEN SO UNHAPPY THAT I HAVE HAD DIFFICULTY SLEEPING: NOT AT ALL
I HAVE BEEN ANXIOUS OR WORRIED FOR NO GOOD REASON: NO, NOT AT ALL

## 2022-05-18 ASSESSMENT — PAIN DESCRIPTION - PAIN TYPE
TYPE: SURGICAL PAIN
TYPE: SURGICAL PAIN

## 2022-05-18 NOTE — PROGRESS NOTES
1900- Report received and care assumed     2000- Patient transferred to postpartum via stretcher. Report given to Emilia and care relinquished

## 2022-05-18 NOTE — ANESTHESIA PROCEDURE NOTES
Spinal Block    Date/Time: 5/17/2022 4:40 PM  Performed by: Ted Pang M.D.  Authorized by: Ted Pang M.D.     Start Time:  5/17/2022 4:40 PM  End Time:  5/17/2022 4:50 PM  Reason for Block: primary anesthetic    patient identified, IV checked, site marked, risks and benefits discussed, surgical consent, monitors and equipment checked, pre-op evaluation and timeout performed    Patient Position:  Sitting  Prep: ChloraPrep, patient draped and sterile technique    Monitoring:  Blood pressure, continuous pulse oximetry and heart rate  Approach:  Midline  Location:  L3-4  Injection Technique:  Single-shot  Skin infiltration:  Lidocaine  Strength:  1%  Dose:  3ml  Needle Type:  Pencan  Needle Gauge:  25 G  CSF flowing pre/post injection:  Yes  Sensory Level:  T4

## 2022-05-18 NOTE — CARE PLAN
The patient is Stable - Low risk of patient condition declining or worsening    Shift Goals  Clinical Goals: Pain management, normal lochia    Progress made toward(s) clinical / shift goals:  Patient reports adequate pain management with current medications. She makes her needs known and will request additional medication if needed. Lochia changes are normal. Patient has ambulated to the bathroom and tolerated well.    Patient is not progressing towards the following goals:

## 2022-05-18 NOTE — LACTATION NOTE
This note was copied from a baby's chart.  Mom is a 37 y/o P 2 who delivered baby girl weighing 6 # 10 oz at 37.2 wkls. Mom reports darker and enlarged areolas during pregnancy. Mom denies any breast surgeries, diabetes, thyroid or fertility issues.  Upon entry to room mom was going to walk baby in the baker. Baby had a pacifier in her mouth. LC reviewed use of a pacifier after breast feeding established. Mom states bay is not latching and she would like to pump and provide. Mom has been giving DBM these past four feeds and has not started pumping. LC set up bedside Ameda breast pump for mother and instructed on proper settings and use of pump. LC also reviewed assembly and cleaning of parts. Mom was noticing some drops after several minutes of pumping. Reviewed demand feeds of 8 or more times in 24 hours. LC reviewed the supplemental feeding volumes guidelines with mother. Mom understands that she needs to be pumping to receive DBM. Mom will call [facility] to obtain a breast pump if possible. Mom realizes that formula will need to be provided to baby after discharge if mother is not pumping enough volume. LC gave mom the supplemental volume feeding guidelines and reviewed.  LC suggested that mom call for any assistance needed with feedings. Follow up with lactation in the morning.

## 2022-05-18 NOTE — OP REPORT
Date: 2022    PREOPERATIVE DIAGNOSIS:  1.IUP @ 37w2d  2. Low lying placenta     POSTOPERATIVE DIAGNOSIS:  1. Same      PROCEDURE: Primary Low transverse      SURGEON: Iza Gastelum MD    ASSISTANT: Sonny Pickering MD    ANESTHESIA: Dr. Monsivais    ESTIMATED BLOOD LOSS: 800 cc  Delivery Blood Loss         IV FLUIDS: 1500 cc    URINE OUTPUT: 40 cc    COMPLICATIONS: None    FINDINGS: Viable girl infant in vertex presentation, weighing 3020 gm, Apgars 8/8, normal uterus, tubes, and ovaries noted.     INDICATION: This is a 36 y.o. yo  at 37w2d who presents to L&D for scheduled CS due to low lying placenta.     PROCEDURE NOTE: The patient was taken to the operating room where epidural anesthesia was found to be adequate. The patient was prepped and draped in the usual sterile fashion in the dorsal supine position with a left-alfaro tilt. A Pfannenstiel skin incision was made with the scalpel and carried through to the underlying layer of fascia. The fascia was incised in the midline and extended laterally using Curry scissors. Kocher clamps were used to elevate the superior aspect of the fascial incision and the underlying rectus muscles were dissected off bluntly and using Curry scissors. Attention was then turned to the inferior aspect of the fascial incision, which in similar fashion was grasped with Kocher clamps, elevated, and the underlying rectus muscles were dissected off bluntly and using Curry scissors. The rectus muscles were dissected in the midline bluntly.    The peritoneum was bluntly dissected, entered, and extended superiorly and inferiorly with good visualization of the bladder. The Александр retractor was inserted for greater visualization. The lower uterine segment was incised in a transverse fashion using the scalpel and extended using manual traction. Clear fluid was noted. The infant was delivered atraumatically.  Delayed cord clamping was completed. The cord was then clamped and cut. The  infant was then handed to the nurse. The placenta was removed manually, found to be intact with a 3-vessel cord. The uterus was cleared of all clots and debris. The uterine incision was repaired in 2 layers using 0 vicryl suture. Hemostasis was visualized.     The uterine incision was reexamined and was noted to be hemostatic. The fascia was closed with 0 Vicryl in a continue fashion. The subcutaneous layer was closed with 2-0 vicryl and the skin was closed with 4-0 monocryl. Sponge, lap, and instrument counts were correct x2. The patient tolerated the procedure well and was transferred to the recovery room in stable condition.

## 2022-05-18 NOTE — PROGRESS NOTES
Post Partum Progress Note    Name:   Mayte Spivey   Date/Time:  2022 - 6:52 AM  Chief Admitting Dx:  Indication for care in labor or delivery [O75.9]  Delivery by elective  section [O82]  Delivery Type:   for low-lying placenta  Post-Op/Post Partum Days #:  1    Subjective:  Abdominal pain: no  Ambulating:   yes  Tolerating liquids:  yes  Tolerating food:  yes light snacks  Flatus:   no  BM:    no  Bleeding:   with a moderate amount of bleeding  Voiding:   N\A; catheter in place  Dizziness:   no  Feeding:   breast    Vitals:    22 2300 22 0000 22 0100 22 0600   BP:    130/81   Pulse: (!) 109 (!) 112 (!) 111 92   Resp:    Temp:    36.6 °C (97.9 °F)   TempSrc:    Temporal   SpO2: 97% 94% 96% 94%   Weight:       Height:           Exam:  Breast: Tenderness no  Abdomen: Abdomen soft, non-tender. BS normal. No masses,  No organomegaly  Fundal Tenderness:  no  Fundus Firm: yes  Incision: dry and intact  Below umbilicus: yes  Perineum: perineum intact  Lochia: moderate  Extremities: Normal extremities, peripheral pulses and reflexes normal, no edema, redness or tenderness in the calves or thighs    Meds:  Current Facility-Administered Medications   Medication Dose   • lactated ringers infusion     • acetaminophen (TYLENOL) tablet 1,000 mg  1,000 mg   • ketorolac (TORADOL) injection 30 mg  30 mg   • oxyCODONE immediate-release (ROXICODONE) tablet 5 mg  5 mg   • oxyCODONE immediate-release (ROXICODONE) tablet 10 mg  10 mg   • HYDROmorphone (Dilaudid) injection 0.2 mg  0.2 mg   • HYDROmorphone (Dilaudid) injection 0.4 mg  0.4 mg   • ePHEDrine injection 10 mg  10 mg   • diphenhydrAMINE (BENADRYL) injection 12.5 mg  12.5 mg    Or   • diphenhydrAMINE (BENADRYL) injection 25 mg  25 mg    Or   • Naloxone HCl (NARCAN) 20 mg in  mL infusion  0.4 mg/hr   • ondansetron (ZOFRAN) syringe/vial injection 4 mg  4 mg   • diphenhydrAMINE (BENADRYL) injection 12.5 mg  12.5  mg   • lactated ringers infusion     • docusate sodium (COLACE) capsule 100 mg  100 mg   • tetanus-dipth-acell pertussis (Tdap) inj 0.5 mL  0.5 mL   • measles, mumps and rubella vaccine (MMR) injection 0.5 mL  0.5 mL   • FENTANYL CITRATE (PF) 0.05 MG/ML INJ SOLN (WRAPPED)         Labs:   Recent Labs     22  0845 22  0346   WBC 11.1* 13.4*   RBC 3.65* 3.45*   HEMOGLOBIN 8.7* 8.3*   HEMATOCRIT 28.1* 28.0*   MCV 77.0* 81.2*   MCH 23.8* 24.1*   MCHC 31.0* 29.6*   RDW 43.1 46.8   PLATELETCT 314 286   MPV 8.7* 9.0       Assessment:  Chief Admitting Dx:  Indication for care in labor or delivery [O75.9]  Delivery by elective  section [O82]  Delivery Type:   for low-lying placenta  Tubal Ligation:  no    Plan:  Continue routine post partum care.  Repeat CBC today     LUIS Verdugo.

## 2022-05-18 NOTE — ANESTHESIA TIME REPORT
Anesthesia Start and Stop Event Times     Date Time Event    5/17/2022 1122 Ready for Procedure     1635 Anesthesia Start     1756 Anesthesia Stop        Responsible Staff  05/17/22    Name Role Begin End    Ted Pang M.D. Anesth 1635 1757        Overtime Reason:  no overtime (within assigned shift)    Comments:

## 2022-05-18 NOTE — ANESTHESIA POSTPROCEDURE EVALUATION
Patient: Mayte Spivey    Procedure Summary     Date: 22 Room / Location: [facility] OR 01 / SURGERY LABOR AND DELIVERY    Anesthesia Start: 163 Anesthesia Stop:     Procedure:  SECTION, PRIMARY Diagnosis: (Same, delivered)    Surgeons: Iza Gastelum M.D. Responsible Provider: Ted Pang M.D.    Anesthesia Type: spinal ASA Status: 2          Final Anesthesia Type: spinal  Last vitals  BP   Blood Pressure: 137/71    Temp   35.9 °C (96.7 °F)    Pulse   (!) 106   Resp   17    SpO2   96 %      Anesthesia Post Evaluation    Patient location during evaluation: PACU  Patient participation: complete - patient participated  Level of consciousness: awake and alert  Pain score: 0    Airway patency: patent  Anesthetic complications: no  Cardiovascular status: hemodynamically stable  Respiratory status: acceptable  Hydration status: euvolemic    PONV: none          No complications documented.     Nurse Pain Score: 1 (NPRS)

## 2022-05-19 LAB
BASOPHILS # BLD AUTO: 0.3 % (ref 0–1.8)
BASOPHILS # BLD: 0.03 K/UL (ref 0–0.12)
EOSINOPHIL # BLD AUTO: 0.33 K/UL (ref 0–0.51)
EOSINOPHIL NFR BLD: 3.5 % (ref 0–6.9)
ERYTHROCYTE [DISTWIDTH] IN BLOOD BY AUTOMATED COUNT: 46.2 FL (ref 35.9–50)
HCT VFR BLD AUTO: 22.1 % (ref 37–47)
HGB BLD-MCNC: 6.7 G/DL (ref 12–16)
IMM GRANULOCYTES # BLD AUTO: 0.14 K/UL (ref 0–0.11)
IMM GRANULOCYTES NFR BLD AUTO: 1.5 % (ref 0–0.9)
LYMPHOCYTES # BLD AUTO: 2.06 K/UL (ref 1–4.8)
LYMPHOCYTES NFR BLD: 21.6 % (ref 22–41)
MCH RBC QN AUTO: 23.8 PG (ref 27–33)
MCHC RBC AUTO-ENTMCNC: 30.3 G/DL (ref 33.6–35)
MCV RBC AUTO: 78.6 FL (ref 81.4–97.8)
MONOCYTES # BLD AUTO: 0.87 K/UL (ref 0–0.85)
MONOCYTES NFR BLD AUTO: 9.1 % (ref 0–13.4)
NEUTROPHILS # BLD AUTO: 6.11 K/UL (ref 2–7.15)
NEUTROPHILS NFR BLD: 64 % (ref 44–72)
NRBC # BLD AUTO: 0 K/UL
NRBC BLD-RTO: 0 /100 WBC
PLATELET # BLD AUTO: 278 K/UL (ref 164–446)
PMV BLD AUTO: 8.8 FL (ref 9–12.9)
RBC # BLD AUTO: 2.81 M/UL (ref 4.2–5.4)
WBC # BLD AUTO: 9.5 K/UL (ref 4.8–10.8)

## 2022-05-19 PROCEDURE — 36415 COLL VENOUS BLD VENIPUNCTURE: CPT

## 2022-05-19 PROCEDURE — 700102 HCHG RX REV CODE 250 W/ 637 OVERRIDE(OP): Performed by: STUDENT IN AN ORGANIZED HEALTH CARE EDUCATION/TRAINING PROGRAM

## 2022-05-19 PROCEDURE — A9270 NON-COVERED ITEM OR SERVICE: HCPCS | Performed by: STUDENT IN AN ORGANIZED HEALTH CARE EDUCATION/TRAINING PROGRAM

## 2022-05-19 PROCEDURE — 770002 HCHG ROOM/CARE - OB PRIVATE (112)

## 2022-05-19 PROCEDURE — A9270 NON-COVERED ITEM OR SERVICE: HCPCS | Performed by: OBSTETRICS & GYNECOLOGY

## 2022-05-19 PROCEDURE — 700102 HCHG RX REV CODE 250 W/ 637 OVERRIDE(OP): Performed by: OBSTETRICS & GYNECOLOGY

## 2022-05-19 PROCEDURE — 85025 COMPLETE CBC W/AUTO DIFF WBC: CPT

## 2022-05-19 RX ADMIN — IBUPROFEN 800 MG: 800 TABLET, FILM COATED ORAL at 19:40

## 2022-05-19 RX ADMIN — FERROUS SULFATE TAB 325 MG (65 MG ELEMENTAL FE) 325 MG: 325 (65 FE) TAB at 09:28

## 2022-05-19 RX ADMIN — ACETAMINOPHEN 1000 MG: 500 TABLET ORAL at 13:00

## 2022-05-19 RX ADMIN — ACETAMINOPHEN 1000 MG: 500 TABLET ORAL at 06:07

## 2022-05-19 RX ADMIN — ACETAMINOPHEN 1000 MG: 500 TABLET ORAL at 23:41

## 2022-05-19 RX ADMIN — OXYCODONE 5 MG: 5 TABLET ORAL at 09:28

## 2022-05-19 RX ADMIN — IBUPROFEN 800 MG: 800 TABLET, FILM COATED ORAL at 11:06

## 2022-05-19 RX ADMIN — DOCUSATE SODIUM 100 MG: 100 CAPSULE, LIQUID FILLED ORAL at 09:28

## 2022-05-19 RX ADMIN — IBUPROFEN 800 MG: 800 TABLET, FILM COATED ORAL at 03:24

## 2022-05-19 RX ADMIN — ACETAMINOPHEN 1000 MG: 500 TABLET ORAL at 18:15

## 2022-05-19 ASSESSMENT — PAIN DESCRIPTION - PAIN TYPE
TYPE: ACUTE PAIN
TYPE: SURGICAL PAIN

## 2022-05-19 NOTE — PROGRESS NOTES
1950 Pt doing well bonding with baby, Assessment done wound covered with Mepilex Silver clean and dry, Abdomen soft non distended, voiding without difficulty,  negative for Ezequiel signs, Encourage more ambulation, Philip pain at this time, Needs attended.

## 2022-05-19 NOTE — PROGRESS NOTES
0700-- Received report from HARJINDER Fuchs, Infant at bedside in open crib no signs of distress.  Pt resting in bed. Discussed pain management for the day.  No further needs at the time.  Call light within reach, bed locked and in lowest position.  Rounding in place.    0930-- Assessment completed, VSS, Pt given PRN pain medication at this time.  Discussed plan of care for the day that pt is comfortable with.  All questions answered at this time.  Will continue to monitor.

## 2022-05-19 NOTE — PROGRESS NOTES
Obstetrics & Gynecology Post-Delivery Progress Note    Date of Service      36 y.o.  s/p  for low lying placenta  Delivery date: 22      Subjective  Pt reports   Pain: Yes,  location incision site  Bleeding: lochia minimal  Tolerating PO: yes  Voiding: without difficulty  Ambulating: yes  Passing flatus: Yes  Feeding: breastfeeding well  Dizziness: Denied  Weakness: Denied    Objective  24hr VS:  Temp:  [36.2 °C (97.2 °F)-36.8 °C (98.2 °F)] 36.8 °C (98.2 °F)  Pulse:  [] 94  Resp:  [18-20] 18  BP: ()/(62-85) 134/84  SpO2:  [94 %-97 %] 96 %    Physical Exam  Gen: well  CV: RRR   Resp: unlabored respirations, no intercostal retractions or accessory muscle use, clear to auscultation without rales or wheezes  Abd: normal bowel sounds, soft  Fundus: firm and below umbilicus  Incision: dressing clean, dry, intact  Ext: symmetric and no edema, calves nontender    Labs:  Results for orders placed or performed during the hospital encounter of 22   CBC WITH DIFFERENTIAL   Result Value Ref Range    WBC 11.1 (H) 4.8 - 10.8 K/uL    RBC 3.65 (L) 4.20 - 5.40 M/uL    Hemoglobin 8.7 (L) 12.0 - 16.0 g/dL    Hematocrit 28.1 (L) 37.0 - 47.0 %    MCV 77.0 (L) 81.4 - 97.8 fL    MCH 23.8 (L) 27.0 - 33.0 pg    MCHC 31.0 (L) 33.6 - 35.0 g/dL    RDW 43.1 35.9 - 50.0 fL    Platelet Count 314 164 - 446 K/uL    MPV 8.7 (L) 9.0 - 12.9 fL    Neutrophils-Polys 68.80 44.00 - 72.00 %    Lymphocytes 19.70 (L) 22.00 - 41.00 %    Monocytes 8.30 0.00 - 13.40 %    Eosinophils 1.30 0.00 - 6.90 %    Basophils 0.40 0.00 - 1.80 %    Immature Granulocytes 1.50 (H) 0.00 - 0.90 %    Nucleated RBC 0.00 /100 WBC    Neutrophils (Absolute) 7.65 (H) 2.00 - 7.15 K/uL    Lymphs (Absolute) 2.20 1.00 - 4.80 K/uL    Monos (Absolute) 0.93 (H) 0.00 - 0.85 K/uL    Eos (Absolute) 0.15 0.00 - 0.51 K/uL    Baso (Absolute) 0.04 0.00 - 0.12 K/uL    Immature Granulocytes (abs) 0.17 (H) 0.00 - 0.11 K/uL    NRBC (Absolute) 0.00 K/uL   COD -  Adult (Type and Screen)   Result Value Ref Range    ABO Grouping Only A     Rh Grouping Only POS     Antibody Screen-Cod NEG    FERRITIN   Result Value Ref Range    Ferritin 6.3 (L) 10.0 - 291.0 ng/mL   IRON/TOTAL IRON BIND   Result Value Ref Range    Iron 22 (L) 40 - 170 ug/dL    Total Iron Binding 465 (H) 250 - 450 ug/dL    Unsat Iron Binding 443 (H) 110 - 370 ug/dL    % Saturation 5 (L) 15 - 55 %   CBC without differential- Once in AM regardless of delivery time   Result Value Ref Range    WBC 13.4 (H) 4.8 - 10.8 K/uL    RBC 3.45 (L) 4.20 - 5.40 M/uL    Hemoglobin 8.3 (L) 12.0 - 16.0 g/dL    Hematocrit 28.0 (L) 37.0 - 47.0 %    MCV 81.2 (L) 81.4 - 97.8 fL    MCH 24.1 (L) 27.0 - 33.0 pg    MCHC 29.6 (L) 33.6 - 35.0 g/dL    RDW 46.8 35.9 - 50.0 fL    Platelet Count 286 164 - 446 K/uL    MPV 9.0 9.0 - 12.9 fL   PERIPHERAL SMEAR REVIEW   Result Value Ref Range    Peripheral Smear Review see below    CBC WITH DIFFERENTIAL   Result Value Ref Range    WBC 13.4 (H) 4.8 - 10.8 K/uL    RBC 3.45 (L) 4.20 - 5.40 M/uL    Hemoglobin 8.3 (L) 12.0 - 16.0 g/dL    Hematocrit 28.0 (L) 37.0 - 47.0 %    MCV 81.2 (L) 81.4 - 97.8 fL    MCH 24.1 (L) 27.0 - 33.0 pg    MCHC 29.6 (L) 33.6 - 35.0 g/dL    RDW 46.8 35.9 - 50.0 fL    Platelet Count 286 164 - 446 K/uL    MPV 9.0 9.0 - 12.9 fL    Neutrophils-Polys 74.70 (H) 44.00 - 72.00 %    Lymphocytes 14.90 (L) 22.00 - 41.00 %    Monocytes 8.60 0.00 - 13.40 %    Eosinophils 0.50 0.00 - 6.90 %    Basophils 0.60 0.00 - 1.80 %    Immature Granulocytes 0.70 0.00 - 0.90 %    Nucleated RBC 0.00 /100 WBC    Neutrophils (Absolute) 9.71 (H) 2.00 - 7.15 K/uL    Lymphs (Absolute) 1.94 1.00 - 4.80 K/uL    Monos (Absolute) 1.12 (H) 0.00 - 0.85 K/uL    Eos (Absolute) 0.06 0.00 - 0.51 K/uL    Baso (Absolute) 0.08 0.00 - 0.12 K/uL    Immature Granulocytes (abs) 0.09 0.00 - 0.11 K/uL    NRBC (Absolute) 0.00 K/uL   Comp Metabolic Panel   Result Value Ref Range    Sodium 135 135 - 145 mmol/L    Potassium 4.5  3.6 - 5.5 mmol/L    Chloride 103 96 - 112 mmol/L    Co2 22 20 - 33 mmol/L    Anion Gap 10.0 7.0 - 16.0    Glucose 96 65 - 99 mg/dL    Bun 23 (H) 8 - 22 mg/dL    Creatinine 0.67 0.50 - 1.40 mg/dL    Calcium 9.2 8.5 - 10.5 mg/dL    AST(SGOT) 19 12 - 45 U/L    ALT(SGPT) 7 2 - 50 U/L    Alkaline Phosphatase 93 30 - 99 U/L    Total Bilirubin 0.2 0.1 - 1.5 mg/dL    Albumin 2.7 (L) 3.2 - 4.9 g/dL    Total Protein 5.6 (L) 6.0 - 8.2 g/dL    Globulin 2.9 1.9 - 3.5 g/dL    A-G Ratio 0.9 g/dL   ESTIMATED GFR   Result Value Ref Range    GFR (CKD-EPI) 115 >60 mL/min/1.73 m 2   CBC WITH DIFFERENTIAL   Result Value Ref Range    WBC 11.0 (H) 4.8 - 10.8 K/uL    RBC 2.99 (L) 4.20 - 5.40 M/uL    Hemoglobin 7.0 (L) 12.0 - 16.0 g/dL    Hematocrit 23.0 (L) 37.0 - 47.0 %    MCV 76.9 (L) 81.4 - 97.8 fL    MCH 23.4 (L) 27.0 - 33.0 pg    MCHC 30.4 (L) 33.6 - 35.0 g/dL    RDW 44.5 35.9 - 50.0 fL    Platelet Count 261 164 - 446 K/uL    MPV 8.9 (L) 9.0 - 12.9 fL    Neutrophils-Polys 67.60 44.00 - 72.00 %    Lymphocytes 20.80 (L) 22.00 - 41.00 %    Monocytes 8.30 0.00 - 13.40 %    Eosinophils 1.70 0.00 - 6.90 %    Basophils 0.50 0.00 - 1.80 %    Immature Granulocytes 1.10 (H) 0.00 - 0.90 %    Nucleated RBC 0.00 /100 WBC    Neutrophils (Absolute) 7.46 (H) 2.00 - 7.15 K/uL    Lymphs (Absolute) 2.30 1.00 - 4.80 K/uL    Monos (Absolute) 0.92 (H) 0.00 - 0.85 K/uL    Eos (Absolute) 0.19 0.00 - 0.51 K/uL    Baso (Absolute) 0.05 0.00 - 0.12 K/uL    Immature Granulocytes (abs) 0.12 (H) 0.00 - 0.11 K/uL    NRBC (Absolute) 0.00 K/uL         Medications  oxytocin, 2,000 mL/hr, Intravenous, Once  ibuprofen, 800 mg, Oral, Q8HRS  acetaminophen, 1,000 mg, Oral, Q6HRS  ferrous sulfate, 325 mg, Oral, QDAY with Breakfast      PRN medications: oxytocin, ibuprofen **FOLLOWED BY** [START ON 5/21/2022] ibuprofen, acetaminophen **FOLLOWED BY** [START ON 5/22/2022] acetaminophen, oxyCODONE immediate-release, oxyCODONE immediate release, ondansetron **OR** ondansetron,  diphenhydrAMINE **OR** diphenhydrAMINE, LR, docusate sodium, tetanus-dipth-acell pertussis, measles, mumps and rubella vaccine      Assessment/Plan  Mayte Spivey is a 36 y.o.yo  postpartum day #2  s/p  for low lying placenta    - Post care: meeting all goals  - Hemoglobin 7, oral iron started. Will review CBC again today.  - Pain: controlled  - Rh+, Rubella Immune  - Method of Feeding: plans to breastfeed  - Method of Contraception: nothing  VTE prophylaxis: none indicated    - Disposition: likely home PPD3     Mary Cohen M.D.

## 2022-05-19 NOTE — DISCHARGE PLANNING
:    Received order that MOB is looking for a Pediatrician that will accept her insurance.    Intervention:  Met with MOB who has [place] Medicaid.  MOB stated she just found out that their Pediatrician-Dr. Raymond does not accept [place].  SW provided mother with a list of Pediatricians that accept Medicaid.  Explained that she will need to call the Pediatrician's office to find out which type of Medicaid they will accept.  MOB agreeable.    Plan:  Nothing further.

## 2022-05-19 NOTE — CARE PLAN
Problem: Knowledge Deficit - Postpartum  Goal: Patient will verbalize and demonstrate understanding of self and infant care  Outcome: Progressing     Problem: Psychosocial - Postpartum  Goal: Patient will verbalize and demonstrate effective bonding and parenting behavior  Outcome: Progressing     Problem: Altered Physiologic Condition  Goal: Patient physiologically stable as evidenced by normal lochia, palpable uterine involution and vitals within normal limits  Outcome: Progressing     Problem: Bowel Elimination - Post Surgical  Goal: Patient will resume regular bowel sounds and function with no discomfort or distention  Outcome: Progressing   The patient is hemodynamically stable    Shift Goals: Pain controlled, ambulation, rest  Clinical Goals: pain management  Patient Goals: pain controlled, ambulation, ,rest  Family Goals: rest    Progress made toward(s) clinical / shift goals:  pt verbalized acceptable level of pain    Patient is not progressing towards the following goals:

## 2022-05-20 ENCOUNTER — PHARMACY VISIT (OUTPATIENT)
Dept: PHARMACY | Facility: MEDICAL CENTER | Age: 37
End: 2022-05-20
Payer: MEDICARE

## 2022-05-20 VITALS
OXYGEN SATURATION: 95 % | RESPIRATION RATE: 18 BRPM | BODY MASS INDEX: 48 KG/M2 | SYSTOLIC BLOOD PRESSURE: 126 MMHG | HEIGHT: 59 IN | TEMPERATURE: 98.6 F | HEART RATE: 95 BPM | WEIGHT: 238.1 LBS | DIASTOLIC BLOOD PRESSURE: 65 MMHG

## 2022-05-20 LAB
ERYTHROCYTE [DISTWIDTH] IN BLOOD BY AUTOMATED COUNT: 45.2 FL (ref 35.9–50)
HCT VFR BLD AUTO: 21.7 % (ref 37–47)
HGB BLD-MCNC: 6.7 G/DL (ref 12–16)
MCH RBC QN AUTO: 23.8 PG (ref 27–33)
MCHC RBC AUTO-ENTMCNC: 30.9 G/DL (ref 33.6–35)
MCV RBC AUTO: 77.2 FL (ref 81.4–97.8)
PLATELET # BLD AUTO: 284 K/UL (ref 164–446)
PMV BLD AUTO: 8.8 FL (ref 9–12.9)
RBC # BLD AUTO: 2.81 M/UL (ref 4.2–5.4)
WBC # BLD AUTO: 9.7 K/UL (ref 4.8–10.8)

## 2022-05-20 PROCEDURE — 700102 HCHG RX REV CODE 250 W/ 637 OVERRIDE(OP): Performed by: STUDENT IN AN ORGANIZED HEALTH CARE EDUCATION/TRAINING PROGRAM

## 2022-05-20 PROCEDURE — 36415 COLL VENOUS BLD VENIPUNCTURE: CPT

## 2022-05-20 PROCEDURE — RXMED WILLOW AMBULATORY MEDICATION CHARGE: Performed by: NURSE PRACTITIONER

## 2022-05-20 PROCEDURE — 85027 COMPLETE CBC AUTOMATED: CPT

## 2022-05-20 PROCEDURE — A9270 NON-COVERED ITEM OR SERVICE: HCPCS | Performed by: STUDENT IN AN ORGANIZED HEALTH CARE EDUCATION/TRAINING PROGRAM

## 2022-05-20 PROCEDURE — A9270 NON-COVERED ITEM OR SERVICE: HCPCS | Performed by: OBSTETRICS & GYNECOLOGY

## 2022-05-20 PROCEDURE — 700102 HCHG RX REV CODE 250 W/ 637 OVERRIDE(OP): Performed by: OBSTETRICS & GYNECOLOGY

## 2022-05-20 RX ORDER — FERROUS SULFATE 325(65) MG
325 TABLET ORAL DAILY
Qty: 30 TABLET | Refills: 4 | Status: SHIPPED | OUTPATIENT
Start: 2022-05-20

## 2022-05-20 RX ORDER — ACETAMINOPHEN 500 MG
1000 TABLET ORAL EVERY 6 HOURS
Qty: 30 TABLET | Refills: 0 | Status: SHIPPED | OUTPATIENT
Start: 2022-05-20

## 2022-05-20 RX ORDER — PSEUDOEPHEDRINE HCL 30 MG
100 TABLET ORAL 2 TIMES DAILY PRN
Qty: 60 CAPSULE | Refills: 0 | Status: SHIPPED | OUTPATIENT
Start: 2022-05-20

## 2022-05-20 RX ORDER — IBUPROFEN 800 MG/1
800 TABLET ORAL EVERY 8 HOURS
Qty: 30 TABLET | Refills: 0 | Status: SHIPPED | OUTPATIENT
Start: 2022-05-20

## 2022-05-20 RX ADMIN — ACETAMINOPHEN 1000 MG: 500 TABLET ORAL at 05:31

## 2022-05-20 RX ADMIN — IBUPROFEN 800 MG: 800 TABLET, FILM COATED ORAL at 11:41

## 2022-05-20 RX ADMIN — FERROUS SULFATE TAB 325 MG (65 MG ELEMENTAL FE) 325 MG: 325 (65 FE) TAB at 07:58

## 2022-05-20 RX ADMIN — IBUPROFEN 800 MG: 800 TABLET, FILM COATED ORAL at 03:24

## 2022-05-20 RX ADMIN — ACETAMINOPHEN 1000 MG: 500 TABLET ORAL at 11:41

## 2022-05-20 ASSESSMENT — PATIENT HEALTH QUESTIONNAIRE - PHQ9
SUM OF ALL RESPONSES TO PHQ9 QUESTIONS 1 AND 2: 0
1. LITTLE INTEREST OR PLEASURE IN DOING THINGS: NOT AT ALL
2. FEELING DOWN, DEPRESSED, IRRITABLE, OR HOPELESS: NOT AT ALL

## 2022-05-20 NOTE — DISCHARGE PLANNING
Meds-to-Beds: Discharge prescription orders listed below delivered to patient's bedside and counseled by pharmacy intern Rodger. RN Myrna notified.     Current Outpatient Medications   Medication Sig Dispense Refill   • acetaminophen (TYLENOL) 500 MG Tab Take 2 Tablets by mouth every 6 hours. 30 Tablet 0   • docusate sodium 100 MG Cap Take 1 capsule by mouth 2 times a day as needed for Constipation. 60 Capsule 0   • ibuprofen (MOTRIN) 800 MG Tab Take 1 Tablet by mouth every 8 hours. Indications: Joint Damage causing Pain and Loss of Function 30 Tablet 0   • ferrous sulfate 325 (65 Fe) MG tablet Take 1 Tablet by mouth every day. 30 Tablet 4      Adeline Pearson, PharmD

## 2022-05-20 NOTE — CARE PLAN
Problem: Knowledge Deficit - L&D  Goal: Patient and family/caregivers will demonstrate understanding of plan of care, disease process/condition, diagnostic tests and medications  Outcome: Met   The patient is Stable - Low risk of patient condition declining or worsening    Shift Goals  Clinical Goals: pain management  Patient Goals: pain control, ambulation, and rest  Family Goals: rest    Progress made toward(s) clinical / shift goals: pain management    Patient is not progressing towards the following goals:

## 2022-05-20 NOTE — DISCHARGE INSTRUCTIONS

## 2022-05-20 NOTE — CARE PLAN
The patient is Stable - Low risk of patient condition declining or worsening    Shift Goals  Clinical Goals: Pt pain will be well controlled with prescribed pain medications.  Patient Goals: pain controlled, ambulation, ,rest  Family Goals: rest    Progress made toward(s) clinical / shift goals:  Pt pain has been well controlled with prescribed medications.  Pt knows to call for breakthrough pain.      Patient is not progressing towards the following goals: N/A

## 2022-05-20 NOTE — DISCHARGE SUMMARY
Discharge Summary:      Mayte Spivey    Admit Date:   2022  Discharge Date:  2022     Admitting diagnosis:  Indication for care in labor or delivery [O75.9]  Delivery by  section [O82]  Discharge Diagnosis: Status post  for low lying placenta.  Pregnancy Complications: CRIS, hx of macrosomia, low-lying placenta, Group B strep carrier  Tubal Ligation:  no        History:  Past Medical History:   Diagnosis Date   • Blood transfusion without reported diagnosis    • Pregnant      OB History    Para Term  AB Living   2 2 2     2   SAB IAB Ectopic Molar Multiple Live Births           0 2      # Outcome Date GA Lbr Edward/2nd Weight Sex Delivery Anes PTL Lv   2 Term 22 37w2d  3.02 kg (6 lb 10.5 oz) F CS-LTranv Spinal N LIVIA      Complications: Placenta Previa   1 Term 09 41w0d  4.451 kg (9 lb 13 oz) M Vag-Spont  N LIVIA        Patient has no known allergies.  Patient Active Problem List    Diagnosis Date Noted   • Indication for care in labor or delivery 2022   • Delivery by elective  section 2022   • Group B streptococcal carriage complicating pregnancy 2022   • Supervision of high-risk pregnancy, third trimester 2022   • Complete placenta previa nos or without hemorrhage, third trimester 2022   • AMA (advanced maternal age) multigravida 35+, second trimester 2021   • H/O macrosomia in infant in prior pregnancy, currently pregnant 2021        Hospital Course:   36 y.o. , now para 2, was admitted with the above mentioned diagnosis, underwent Primary  for low-lying placenta, primary  . Patient postpartum course was unremarkable, with progressive advancement in diet , ambulation and toleration of oral analgesia. Patient without complaints today and desires discharge.      Vitals:    22 0600 22 1632 22 1800 22 0600   BP: 134/84 (!) 141/96 (!) 139/94 126/65   Pulse: 94 94 90  95   Resp: 18 18 18 18   Temp: 36.8 °C (98.2 °F) 36.6 °C (97.9 °F) 36.7 °C (98.1 °F) 37 °C (98.6 °F)   TempSrc: Temporal Temporal Temporal Temporal   SpO2: 96% 96% 97% 95%   Weight:       Height:           Current Facility-Administered Medications   Medication Dose   • oxytocin (PITOCIN) infusion (for postpartum)  125 mL/hr   • oxytocin (PITOCIN) injection 10 Units  10 Units   • ibuprofen (MOTRIN) tablet 800 mg  800 mg   • acetaminophen (TYLENOL) tablet 1,000 mg  1,000 mg   • oxyCODONE immediate-release (ROXICODONE) tablet 5 mg  5 mg   • oxyCODONE immediate-release (ROXICODONE) tablet 10 mg  10 mg   • ondansetron (ZOFRAN) syringe/vial injection 4 mg  4 mg    Or   • ondansetron (ZOFRAN ODT) dispertab 4 mg  4 mg   • diphenhydrAMINE (BENADRYL) tablet/capsule 25 mg  25 mg    Or   • diphenhydrAMINE (BENADRYL) injection 25 mg  25 mg   • ferrous sulfate tablet 325 mg  325 mg   • lactated ringers infusion     • lactated ringers infusion     • docusate sodium (COLACE) capsule 100 mg  100 mg   • tetanus-dipth-acell pertussis (Tdap) inj 0.5 mL  0.5 mL   • measles, mumps and rubella vaccine (MMR) injection 0.5 mL  0.5 mL       Exam:  Breast Exam: negative  Abdomen: Abdomen soft, non-tender. BS normal. No masses,  No organomegaly  Fundus Non Tender: yes  Incision: dry and intact  Perineum: perineum intact  Extremity: extremities, peripheral pulses and reflexes normal     Labs:  Recent Labs     05/18/22  1910 05/19/22  0825 05/20/22  0449   WBC 11.0* 9.5 9.7   RBC 2.99* 2.81* 2.81*   HEMOGLOBIN 7.0* 6.7* 6.7*   HEMATOCRIT 23.0* 22.1* 21.7*   MCV 76.9* 78.6* 77.2*   MCH 23.4* 23.8* 23.8*   MCHC 30.4* 30.3* 30.9*   RDW 44.5 46.2 45.2   PLATELETCT 261 278 284   MPV 8.9* 8.8* 8.8*        Activity:   Discharge to home  Pelvic Rest x 6 weeks    Assessment:  Course complicated by low Hgb of 6.7 following C/S, decreased for 7.0. Pt asymptomatic.  Discharge Assessment: No areas of skin breakdown/redness; surgical incision intact/healing,  Taking in adequate diet and fluids, no heavy bleeding or foul smelling discharge, no redness or severe pain of breasts, voiding without difficulty     Follow up: .[facility] or [facility] in 5 weeks for vaginal ; 1 week for incision check.      Pt need to call or go to ED for any of the following:  Fever over 100.5  Severe abd pain  Severe pain or swelling or drainage of laceration or incision site  Red streaks or painful masses in the breasts  Foul smelling d/c or lochia  Heavy vaginal bleeding saturating a pad per hour  Sxs of PP depression  Severe headache  Visual changes    Discharge Meds:   Current Outpatient Medications   Medication Sig Dispense Refill   • acetaminophen (TYLENOL) 500 MG Tab Take 2 Tablets by mouth every 6 hours. 30 Tablet 0   • docusate sodium 100 MG Cap Take 100 mg by mouth 2 times a day as needed for Constipation. 60 Capsule 0   • ibuprofen (MOTRIN) 800 MG Tab Take 1 Tablet by mouth every 8 hours. Indications: Joint Damage causing Pain and Loss of Function 30 Tablet 0   • ferrous sulfate 325 (65 Fe) MG tablet Take 1 Tablet by mouth every day. 30 Tablet 4       LUIS Villavicencio.

## 2022-05-20 NOTE — PROGRESS NOTES
0700: report received from HARJINDER De La Cruz. Pt stable. All questions and concerns addressed. Vitals WNL. Pain managed with medications per MAR. Call light within reach, bed in lowest and locked position. POC discharge today.     0930: pt encouraged to pump. Pt encouraged to attempt to breastfeed. Pt. Educated on feeding  Q3 hours or when indicated sooner. Pt in agreement.    1300: printed AVS and discharge instructions given to pt. Pt. In agreement to f/u at OB office on  for scheduled appointment. Pt educated on signs/symptoms of infection and incision care. Pt. Educated on importance of rest and no heavy lifting. Pt. Educated on pain control using prescribed medications. Pt  denies further questions/concerns at this time. Pt in agreement with POC. Vitals WNL. Bleeding WNL. Incision WNL.Discharge education complete. Bands confirmed. Pt. Discharged.

## 2022-05-20 NOTE — LACTATION NOTE
This note was copied from a baby's chart.  Attempted latching with infant but she seemed flow oriented. Mom will try drizzling milk onto breast tonight with assistance from FOB. She agreed to call her nurse and allow her to observe latches and pumping, as I explained that this is part of donor milk protocol. Baby latched well and sucked once or twice but did not maintain latch long.

## 2022-05-20 NOTE — LACTATION NOTE
At bedside for latch assistance.  Observed MOB attempt to latch baby onto her left breast in the cross cradle position.  Infant observed latching onto the breast and falling asleep after suckling approximately 2 times.  This LC attempted 2 more times to latch baby onto the breast to feed, but she again suckled two to three times and then fell asleep.  FOB also drizzled donor breast milk onto MOB's breast to entice baby to latch and suckle, but latch attempt was still unsuccessful.    MOB instructed three step feeding plan will continue at home.  This plan consists of: breastfeeding/latch attempt, supplementation with formula and/or expressed breast milk per hospital supplementation guidelines, and pumping as instructed.  MOB stated she is stopping by [place] [facility] office [place] about obtaining a hospital grade breast pump on loan from them.  She stated she attempted to call the [facility] office, but the staff did not answer the phone.  MOB was also reminded of the ability to rent a hospital grade breast pump through the [employer] at discharge, but she stated she wants to see if she can obtain one from [facility] first.  MOB was reminded to take her pump parts home.    MOB provided with the following hand-outs:  1.  Hospital supplementation guidelines along with instructions on use  2.  CDC milk storage guidelines  3.  Hospital grade breast pump rental information sheet    Re-educated MOB on the effect of supply and demand on milk production.    MOB verbalized understanding of all information provided to her and denied having any further questions at this time.

## 2022-05-20 NOTE — PROGRESS NOTES
Assumed care of pt, all questions and concerns addressed. VSS, No s/s of distress or discomfort. Pain managed with medications per MAR. Call light within reach, bed in lowest and locked position.

## 2022-05-20 NOTE — CARE PLAN
The patient is Stable - Low risk of patient condition declining or worsening    Shift Goals  Clinical Goals: Pain management  Patient Goals: Rest  Family Goals: rest    Progress made toward(s) clinical / shift goals:    Plan of care reviewed with pt, all questions and concerns addressed. Pain managed with medications per MAR. Call light within reach.   Problem: Pain  Goal: Patient's pain will be alleviated or reduced to the patient’s comfort goal  Outcome: Progressing     Problem: Knowledge Deficit - Standard  Goal: Patient and family/care givers will demonstrate understanding of plan of care, disease process/condition, diagnostic tests and medications  Outcome: Progressing     Problem: Early Mobilization - Post Surgery  Goal: Early mobilization post surgery  Outcome: Progressing

## 2022-05-31 ENCOUNTER — POST PARTUM (OUTPATIENT)
Dept: OBGYN | Facility: CLINIC | Age: 37
End: 2022-05-31
Payer: COMMERCIAL

## 2022-05-31 VITALS — SYSTOLIC BLOOD PRESSURE: 130 MMHG | BODY MASS INDEX: 45.04 KG/M2 | WEIGHT: 223 LBS | DIASTOLIC BLOOD PRESSURE: 86 MMHG

## 2022-05-31 DIAGNOSIS — Z51.89 VISIT FOR WOUND CHECK: ICD-10-CM

## 2022-05-31 PROCEDURE — 0503F POSTPARTUM CARE VISIT: CPT | Performed by: OBSTETRICS & GYNECOLOGY

## 2022-05-31 ASSESSMENT — FIBROSIS 4 INDEX: FIB4 SCORE: 0.96

## 2022-05-31 NOTE — PROGRESS NOTES
Pt. here for C/S check delivered on 5/17/22  Currently : bottle feeding  Pt. States no complaints  Post partum visit on not scheduled yet

## 2022-05-31 NOTE — PROGRESS NOTES
Mayte Spivey Is a 37 y.o.  status post  delivery on 22. Patient is here today for an incision check.  Pain is well controlled.  Bottle Feeding. Minimal lochia.  Denies depression or anxiety. No bowel or bladder complaints.       /86   Wt 101 kg (223 lb)   LMP 2021 (Approximate)   BMI 45.04 kg/m²   ABD  -soft, nontender, nondistended.  Incision  -healing well, clean, dry, intact.    Assessment and plan  Status post  delivery  No complications incision healing well  Followup in 4 weeks for final postpartum checkup

## 2023-06-20 ENCOUNTER — OFFICE VISIT (OUTPATIENT)
Dept: URGENT CARE | Facility: CLINIC | Age: 38
End: 2023-06-20
Payer: COMMERCIAL

## 2023-06-20 VITALS
HEART RATE: 113 BPM | HEIGHT: 59 IN | SYSTOLIC BLOOD PRESSURE: 122 MMHG | WEIGHT: 214.7 LBS | OXYGEN SATURATION: 97 % | DIASTOLIC BLOOD PRESSURE: 88 MMHG | TEMPERATURE: 97.3 F | BODY MASS INDEX: 43.28 KG/M2 | RESPIRATION RATE: 16 BRPM

## 2023-06-20 DIAGNOSIS — K43.9 EPIGASTRIC HERNIA: ICD-10-CM

## 2023-06-20 PROCEDURE — 3074F SYST BP LT 130 MM HG: CPT | Performed by: PHYSICIAN ASSISTANT

## 2023-06-20 PROCEDURE — 3079F DIAST BP 80-89 MM HG: CPT | Performed by: PHYSICIAN ASSISTANT

## 2023-06-20 PROCEDURE — 99203 OFFICE O/P NEW LOW 30 MIN: CPT | Performed by: PHYSICIAN ASSISTANT

## 2023-06-20 ASSESSMENT — FIBROSIS 4 INDEX: FIB4 SCORE: 0.99

## 2023-06-21 NOTE — PROGRESS NOTES
"Subjective:   Mayte Spivey is a 38 y.o. female who presents for Other (Possible hernia above bellybutton. X 1 year, cramps, soreness sore to the touch, pressure.)     Patient presents with palpable abdominal defect x1 year after having her daughter via .  She states she does have a history of an umbilical hernia repair but no other known hernias.  She does report occasional constipation.  She denies severe abdominal pain.  She denies nausea vomiting or diarrhea.  She reports the lump does go away when she is sitting down or resting.        Medications:  acetaminophen Tabs  docusate sodium Caps  ferrous sulfate  ibuprofen Tabs    Allergies:             Patient has no known allergies.    Surgical History:         Past Surgical History:   Procedure Laterality Date    PRIMARY C SECTION N/A 2022    Procedure:  SECTION, PRIMARY;  Surgeon: Iza Gastelum M.D.;  [facility];  Service: Labor and Delivery    ABDOMINAL EXPLORATION      CHOLECYSTECTOMY         Past Social Hx:  Mayte Spivey  reports that she has been smoking cigarettes. She has never used smokeless tobacco. She reports that she does not currently use alcohol. She reports that she does not currently use drugs after having used the following drugs: Marijuana and Inhaled.     Past Family Hx:   Mayte Spivey family history includes Cancer in her father; Prostate cancer in her father.       Problem list, medications, and allergies reviewed by myself today in Epic.     Objective:     /88   Pulse (!) 113   Temp 36.3 °C (97.3 °F) (Temporal)   Resp 16   Ht 1.499 m (4' 11\")   Wt 97.4 kg (214 lb 11.2 oz)   LMP 2023 (Approximate)   SpO2 97%   Breastfeeding No   BMI 43.36 kg/m²     Physical Exam  Vitals and nursing note reviewed.   Constitutional:       General: She is not in acute distress.     Appearance: Normal appearance. She is not ill-appearing, toxic-appearing or diaphoretic. "   HENT:      Nose: Nose normal.      Mouth/Throat:      Mouth: Mucous membranes are moist.      Pharynx: No oropharyngeal exudate or posterior oropharyngeal erythema.   Eyes:      Extraocular Movements: Extraocular movements intact.      Pupils: Pupils are equal, round, and reactive to light.   Cardiovascular:      Rate and Rhythm: Normal rate and regular rhythm.      Pulses: Normal pulses.      Heart sounds: Normal heart sounds.   Pulmonary:      Effort: Pulmonary effort is normal. No tachypnea, accessory muscle usage, prolonged expiration, respiratory distress or retractions.      Breath sounds: Normal breath sounds and air entry. No stridor or decreased air movement. No decreased breath sounds, wheezing, rhonchi or rales.      Comments: Lungs cta b/l  Abdominal:      General: Abdomen is flat. Bowel sounds are normal. There is no distension. There are no signs of injury.      Palpations: Abdomen is soft. There is no splenomegaly, mass or pulsatile mass.      Tenderness: There is no abdominal tenderness. There is no right CVA tenderness, left CVA tenderness, guarding or rebound. Negative signs include Pablo's sign, Rovsing's sign and McBurney's sign.      Hernia: No hernia is present.      Comments: There is a palpable epigastric defect measuring approximately 10 x 10 cm consistent with hernia.  It is readily reducible.  There is no overlying redness or signs of strangulation or incarceration.   Musculoskeletal:      Cervical back: No rigidity.   Lymphadenopathy:      Cervical: No cervical adenopathy.   Neurological:      Mental Status: She is alert.         Assessment/Plan:     Diagnosis and Associated Orders:     1. Epigastric hernia  - Referral to General Surgery        Comments/MDM:  Exam most consistent with hernia.  Referral placed to general surgery.  No signs of strangulation or incarceration.  Abdomen soft.  Did discuss signs of strangulation and incarceration.  Patient is advised to go to the ER if these  do occur.  Suspect this will call requires surgical repair.  Return precautions discussed at length.  Abdomen is soft without guarding or rebound.  No peritoneal signs.  I personally reviewed prior external notes and test results pertinent to today's visit. Supportive care, natural history, differential diagnoses, and indications for immediate follow-up discussed. Return to clinic or go to ED if symptoms worsen or persist.  Red flag symptoms discussed.  Patient/Parent/Guardian voices understanding. Follow-up with your primary care provider in 3-5 days.  All side effects of medication discussed including allergic response, GI upset, tendon injury, rash, sedation etc    Please note that this dictation was created using voice recognition software. I have made a reasonable attempt to correct obvious errors, but I expect that there are errors of grammar and possibly content that I did not discover before finalizing the note.    This note was electronically signed by Edith Marinelli PA-C

## 2025-06-12 ENCOUNTER — HOSPITAL ENCOUNTER (EMERGENCY)
Facility: MEDICAL CENTER | Age: 40
End: 2025-06-12
Attending: EMERGENCY MEDICINE
Payer: COMMERCIAL

## 2025-06-12 VITALS
BODY MASS INDEX: 45.6 KG/M2 | OXYGEN SATURATION: 95 % | SYSTOLIC BLOOD PRESSURE: 158 MMHG | TEMPERATURE: 97 F | HEART RATE: 99 BPM | DIASTOLIC BLOOD PRESSURE: 90 MMHG | RESPIRATION RATE: 16 BRPM | WEIGHT: 226.19 LBS | HEIGHT: 59 IN

## 2025-06-12 DIAGNOSIS — R11.2 NAUSEA AND VOMITING, UNSPECIFIED VOMITING TYPE: ICD-10-CM

## 2025-06-12 DIAGNOSIS — E86.0 DEHYDRATION: ICD-10-CM

## 2025-06-12 DIAGNOSIS — R19.7 DIARRHEA, UNSPECIFIED TYPE: Primary | ICD-10-CM

## 2025-06-12 LAB
ALBUMIN SERPL BCP-MCNC: 4.6 G/DL (ref 3.2–4.9)
ALBUMIN/GLOB SERPL: 1.2 G/DL
ALP SERPL-CCNC: 98 U/L (ref 30–99)
ALT SERPL-CCNC: 24 U/L (ref 2–50)
ANION GAP SERPL CALC-SCNC: 16 MMOL/L (ref 7–16)
AST SERPL-CCNC: 25 U/L (ref 12–45)
BASOPHILS # BLD AUTO: 0.4 % (ref 0–1.8)
BASOPHILS # BLD: 0.03 K/UL (ref 0–0.12)
BILIRUB SERPL-MCNC: 0.5 MG/DL (ref 0.1–1.5)
BUN SERPL-MCNC: 15 MG/DL (ref 8–22)
CALCIUM ALBUM COR SERPL-MCNC: 9.3 MG/DL (ref 8.5–10.5)
CALCIUM SERPL-MCNC: 9.8 MG/DL (ref 8.5–10.5)
CHLORIDE SERPL-SCNC: 96 MMOL/L (ref 96–112)
CO2 SERPL-SCNC: 25 MMOL/L (ref 20–33)
CREAT SERPL-MCNC: 0.69 MG/DL (ref 0.5–1.4)
EOSINOPHIL # BLD AUTO: 0.01 K/UL (ref 0–0.51)
EOSINOPHIL NFR BLD: 0.1 % (ref 0–6.9)
ERYTHROCYTE [DISTWIDTH] IN BLOOD BY AUTOMATED COUNT: 48.5 FL (ref 35.9–50)
GFR SERPLBLD CREATININE-BSD FMLA CKD-EPI: 112 ML/MIN/1.73 M 2
GLOBULIN SER CALC-MCNC: 3.9 G/DL (ref 1.9–3.5)
GLUCOSE SERPL-MCNC: 120 MG/DL (ref 65–99)
HCG SERPL QL: NEGATIVE
HCT VFR BLD AUTO: 46.6 % (ref 37–47)
HGB BLD-MCNC: 14.7 G/DL (ref 12–16)
IMM GRANULOCYTES # BLD AUTO: 0.01 K/UL (ref 0–0.11)
IMM GRANULOCYTES NFR BLD AUTO: 0.1 % (ref 0–0.9)
LIPASE SERPL-CCNC: 13 U/L (ref 11–82)
LYMPHOCYTES # BLD AUTO: 0.8 K/UL (ref 1–4.8)
LYMPHOCYTES NFR BLD: 10.5 % (ref 22–41)
MCH RBC QN AUTO: 23.6 PG (ref 27–33)
MCHC RBC AUTO-ENTMCNC: 31.5 G/DL (ref 32.2–35.5)
MCV RBC AUTO: 74.8 FL (ref 81.4–97.8)
MONOCYTES # BLD AUTO: 0.92 K/UL (ref 0–0.85)
MONOCYTES NFR BLD AUTO: 12.1 % (ref 0–13.4)
NEUTROPHILS # BLD AUTO: 5.82 K/UL (ref 1.82–7.42)
NEUTROPHILS NFR BLD: 76.8 % (ref 44–72)
NRBC # BLD AUTO: 0 K/UL
NRBC BLD-RTO: 0 /100 WBC (ref 0–0.2)
PLATELET # BLD AUTO: 504 K/UL (ref 164–446)
PMV BLD AUTO: 8.2 FL (ref 9–12.9)
POTASSIUM SERPL-SCNC: 3.6 MMOL/L (ref 3.6–5.5)
PROT SERPL-MCNC: 8.5 G/DL (ref 6–8.2)
RBC # BLD AUTO: 6.23 M/UL (ref 4.2–5.4)
SODIUM SERPL-SCNC: 137 MMOL/L (ref 135–145)
WBC # BLD AUTO: 7.6 K/UL (ref 4.8–10.8)

## 2025-06-12 PROCEDURE — 83690 ASSAY OF LIPASE: CPT

## 2025-06-12 PROCEDURE — 99284 EMERGENCY DEPT VISIT MOD MDM: CPT

## 2025-06-12 PROCEDURE — 85025 COMPLETE CBC W/AUTO DIFF WBC: CPT

## 2025-06-12 PROCEDURE — 36415 COLL VENOUS BLD VENIPUNCTURE: CPT

## 2025-06-12 PROCEDURE — 84703 CHORIONIC GONADOTROPIN ASSAY: CPT

## 2025-06-12 PROCEDURE — 96374 THER/PROPH/DIAG INJ IV PUSH: CPT

## 2025-06-12 PROCEDURE — 700111 HCHG RX REV CODE 636 W/ 250 OVERRIDE (IP): Mod: JZ,UD | Performed by: EMERGENCY MEDICINE

## 2025-06-12 PROCEDURE — 80053 COMPREHEN METABOLIC PANEL: CPT

## 2025-06-12 PROCEDURE — 700105 HCHG RX REV CODE 258: Mod: UD | Performed by: EMERGENCY MEDICINE

## 2025-06-12 RX ORDER — SODIUM CHLORIDE, SODIUM LACTATE, POTASSIUM CHLORIDE, AND CALCIUM CHLORIDE .6; .31; .03; .02 G/100ML; G/100ML; G/100ML; G/100ML
2000 INJECTION, SOLUTION INTRAVENOUS ONCE
Status: COMPLETED | OUTPATIENT
Start: 2025-06-12 | End: 2025-06-12

## 2025-06-12 RX ORDER — ONDANSETRON 2 MG/ML
4 INJECTION INTRAMUSCULAR; INTRAVENOUS ONCE
Status: COMPLETED | OUTPATIENT
Start: 2025-06-12 | End: 2025-06-12

## 2025-06-12 RX ORDER — ONDANSETRON 4 MG/1
4 TABLET, ORALLY DISINTEGRATING ORAL EVERY 6 HOURS PRN
Qty: 15 TABLET | Refills: 0 | Status: SHIPPED | OUTPATIENT
Start: 2025-06-12 | End: 2025-06-16

## 2025-06-12 RX ADMIN — SODIUM CHLORIDE, POTASSIUM CHLORIDE, SODIUM LACTATE AND CALCIUM CHLORIDE 2000 ML: 600; 310; 30; 20 INJECTION, SOLUTION INTRAVENOUS at 15:47

## 2025-06-12 RX ADMIN — ONDANSETRON 4 MG: 2 INJECTION INTRAMUSCULAR; INTRAVENOUS at 15:46

## 2025-06-12 NOTE — ED NOTES
Pt to [id number].  Pt ambulated to room.  Pt in gown and placed on monitors.  Agree with triage note.   Assessment complete.  ERP to see.     [employer] Counseling:  I discussed with the patient the risks of mycophenolate mofetil including but not limited to infection/immunosuppression, GI upset, hypokalemia, hypercholesterolemia, bone marrow suppression, lymphoproliferative disorders, malignancy, GI ulceration/bleed/perforation, colitis, interstitial lung disease, kidney failure, progressive multifocal leukoencephalopathy, and birth defects.  The patient understands that monitoring is required including a baseline creatinine and regular CBC testing. In addition, patient must alert us immediately if symptoms of infection or other concerning signs are noted.

## 2025-06-12 NOTE — ED NOTES
Patient remains comfortable on gurney, no identifiable needs at this time. Equal chest rise and fall bilaterally, pt connected to monitor. Safety measures in place, call light within reach.

## 2025-06-12 NOTE — ED PROVIDER NOTES
ED Provider Note    CHIEF COMPLAINT  Chief Complaint   Patient presents with    N/V    Diarrhea       EXTERNAL RECORDS REVIEWED  Other Hospital records reviewed: Patient hospitalized in  for a  delivery.    HPI/ROS  LIMITATION TO HISTORY   Select: : None  OUTSIDE HISTORIAN(S):  None    Mayte Spivey is a 40 y.o. female who presents to the emergency department for evaluation of nausea, vomiting, nonbloody diarrhea for the past 24 hours.  She has had generalized abdominal cramping, but no localized pain.  No fever or chills.  Abdominal surgeries include a , and umbilical hernia repair.  She has a recurrent ventral hernia, which is still easily reducible.  No burning or pain with urination.  No flank pain.    PAST MEDICAL HISTORY   has a past medical history of Blood transfusion without reported diagnosis and Hernia, abdominal.    SURGICAL HISTORY   has a past surgical history that includes abdominal exploration; cholecystectomy; and primary c section (N/A, 2022).    FAMILY HISTORY  Family History   Problem Relation Age of Onset    Cancer Father     Prostate cancer Father        SOCIAL HISTORY  Social History     Tobacco Use    Smoking status: Some Days     Types: Cigarettes    Smokeless tobacco: Never   Vaping Use    Vaping status: Never Used   Substance and Sexual Activity    Alcohol use: Not Currently    Drug use: Not Currently     Types: Marijuana, Inhaled     Comment: At night    Sexual activity: Yes     Partners: Male     Comment: None        CURRENT MEDICATIONS  Home Medications       Reviewed by Ashwini Niño R.N. (Registered Nurse) on 25 at 1347  Med List Status: Partial     Medication Last Dose Status   acetaminophen (TYLENOL) 500 MG Tab  Active   docusate sodium 100 MG Cap  Active   ferrous sulfate 325 (65 Fe) MG tablet  Active   ibuprofen (MOTRIN) 800 MG Tab  Active                    ALLERGIES  Allergies[1]    PHYSICAL EXAM  VITAL SIGNS: BP (!) 158/90   Pulse  "99   Temp 36.1 °C (97 °F) (Temporal)   Resp 16   Ht 1.499 m (4' 11\")   Wt 103 kg (226 lb 3.1 oz)   LMP 05/21/2025 (Approximate)   SpO2 95%   BMI 45.69 kg/m²    General: Well-appearing, no acute distress.   Eyes: No scleral icterus. EOM grossly intact BL.  HENT: Mucous membranes dry.   Neck: Normal ROM.   Lungs: Non-labored breathing.   Cardiac: Regular rate and rhythm.   Abdomen: Soft, non-tender, non-distended. No rebound or guarding.  Reducible ventral hernia.  MSK: No joint swelling or erythema. Symmetric movement of all extremities.  Skin: No rashes, lesions, bruising, or petechiae. Well-perfused.   Neuro: Grossly nonfocal neurologic exam. Face symmetric. Normal mentation.     EKG/LABS  CBC - no leukocytosis, normal hemoglobin, normal platelet count.  CMP showed normal renal function, nonactionable electrolytes, normal liver enzymes and lipase without obstructive pattern.  Beta-hCG is negative.    RADIOLOGY/PROCEDURES   I have independently interpreted the diagnostic imaging associated with this visit and am waiting the final reading from the radiologist.   My preliminary interpretation is as follows: None    Radiologist interpretation:  No orders to display     COURSE & MEDICAL DECISION MAKING    ASSESSMENT, COURSE AND PLAN  Care Narrative:   Mayte Spivey is a 40 y.o. female who presents to the emergency department for evaluation of nausea, vomiting, nonbloody diarrhea for the past 24 hours.      0300: On my initial assessment, ABCs are intact.  She is hypertensive, but hemodynamically stable and afebrile.  She is well-appearing and nontoxic.  Abdomen is soft and nontender, no peritoneal signs.  No CVA tenderness.  I think she likely has infectious enterocolitis.  I offered her CT imaging to evaluate for other intra-abdominal pathology including appendicitis, perforation, intra-abdominal abscess, diverticulitis.  She declined imaging.     Lab studies showed no leukocytosis, normal hemoglobin, " normal platelet count.  CMP and lipase unremarkable.  Beta-hCG negative.  She received IV fluids and Zofran.    1730: On my reassessment, she reports significant improvement.  She was able to eat something without vomiting.  She would like to be discharged.  I prescribe Zofran.  I reviewed very strict return precautions, all of her questions were answered, and she was discharged in stable condition.    Hydration: Based on the patient's presentation of Acute Vomiting and Dehydration the patient was given IV fluids. IV Hydration was used because oral hydration was not adequate alone. Upon recheck following hydration, the patient was improved.    ADDITIONAL PROBLEMS MANAGED  N/A    DISPOSITION AND DISCUSSIONS  I have discussed management of the patient with the following physicians and MARGARITA's:  None    Discussion of management with other QHP or appropriate source(s): None     Escalation of care considered, and ultimately not performed:diagnostic imaging and acute inpatient care management, however at this time, the patient is most appropriate for outpatient management    Barriers to care at this time, including but not limited to: Patient does not have established PCP.     Decision tools and prescription drugs considered including, but not limited to: Zofran.    FINAL DIAGNOSIS  1. Diarrhea, unspecified type Acute   2. Nausea and vomiting, unspecified vomiting type Acute   3. Dehydration Acute        Electronically signed by: Ligia Richards D.O., 6/12/2025 3:16 PM           [1] No Known Allergies

## 2025-06-12 NOTE — ED TRIAGE NOTES
Mayte Spivey  40 y.o. female  Chief Complaint   Patient presents with    N/V    Diarrhea       Pt presents to ED for above. Symptoms started last night suddenly. Pt has visible hernia and believes this may be the cause. Pt has had hernia surgically repaired in the past. Pt is alert & oriented, ambulatory. Placed in phlebotomy area for labs.

## 2025-06-13 NOTE — DISCHARGE INSTRUCTIONS
I think you likely have a GI bug.  You can use Zofran 1 tablet every 6-8 hours as needed for nausea vomiting.  Ensure you stay well-hydrated.  If you develop any worsening symptoms including fever, chills, vomiting you cannot control Zofran, abdominal pain, please return to the emergency department to be reevaluated.

## 2025-06-13 NOTE — ED NOTES
Pt being discharged to home with no needs. Prescriptions filled/given via home pharmacy. IV dc'd. Discharge instructions discussed. All questions answered.  Patient agreeable to discharge plan. Patient has all belongings at time of dc. Family to transport.

## (undated) DEVICE — SUTURE 0 VICRYL PLUS CT-1 - 36 INCH (36/BX)

## (undated) DEVICE — PACK ROOM TURNOVER L&D (12/CA)

## (undated) DEVICE — CHLORAPREP 26 ML APPLICATOR - ORANGE TINT(25/CA)

## (undated) DEVICE — PACK C-SECTION (2EA/CA)

## (undated) DEVICE — SOLUTION PLASMA-LYTE PH 7.4 INJ 1000ML  (14EA/CA)

## (undated) DEVICE — KIT  I.V. START (100EA/CA)

## (undated) DEVICE — CATHETER IV NON-SAFETY 18 GA X 1 1/4 (50/BX 4BX/CA)

## (undated) DEVICE — RETAINER 9 1/8INX5 7/8IN MED - (10/BX)

## (undated) DEVICE — SLEEVE, SEQUENTIAL CALF REG

## (undated) DEVICE — BLANKET UNDERBODY FULL ACCES - (5/CA)

## (undated) DEVICE — GLOVE BIOGEL SZ 6.5 SURGICAL PF LTX (50PR/BX 4BX/CA)

## (undated) DEVICE — PLUMEPEN ULTRA 3/8 IN X 10 FT HOSE (20EA/CA)

## (undated) DEVICE — SUTURE 4-0 MONOCRYL PLUS PS-1 - 27 INCH (36/BX)

## (undated) DEVICE — SODIUM CHL IRRIGATION 0.9% 1000ML (12EA/CA)

## (undated) DEVICE — TUBING CLEARLINK DUO-VENT - C-FLO (48EA/CA)

## (undated) DEVICE — HEAD HOLDER JUNIOR/ADULT

## (undated) DEVICE — WATER IRRIGATION STERILE 1000ML (12EA/CA)

## (undated) DEVICE — CANISTER SUCTION 3000ML MECHANICAL FILTER AUTO SHUTOFF MEDI-VAC NONSTERILE LF DISP  (40EA/CA)

## (undated) DEVICE — SUTURE 2-0 VICRYL PLUS CT-1 36 (36PK/BX)"

## (undated) DEVICE — GLOVE BIOGEL INDICATOR SZ 6.5 SURGICAL PF LTX - (50PR/BX 4BX/CA)

## (undated) DEVICE — RETRACTOR O C SECTION LRY - (5/BX)

## (undated) DEVICE — SET EXTENSION WITH 2 PORTS (48EA/CA) ***PART #2C8610 IS A SUBSTITUTE*****

## (undated) DEVICE — TRAY SPINAL ANESTHESIA NON-SAFETY (10/CA)

## (undated) DEVICE — DRESSING POST OP BORDER 4 X 10 (5EA/BX)